# Patient Record
Sex: MALE | Race: WHITE | NOT HISPANIC OR LATINO | Employment: UNEMPLOYED | URBAN - METROPOLITAN AREA
[De-identification: names, ages, dates, MRNs, and addresses within clinical notes are randomized per-mention and may not be internally consistent; named-entity substitution may affect disease eponyms.]

---

## 2017-03-27 ENCOUNTER — MEDICAL CORRESPONDENCE (OUTPATIENT)
Dept: HEALTH INFORMATION MANAGEMENT | Facility: CLINIC | Age: 42
End: 2017-03-27

## 2017-03-27 ENCOUNTER — TRANSFERRED RECORDS (OUTPATIENT)
Dept: HEALTH INFORMATION MANAGEMENT | Facility: CLINIC | Age: 42
End: 2017-03-27

## 2017-04-03 ENCOUNTER — OFFICE VISIT (OUTPATIENT)
Dept: FAMILY MEDICINE | Facility: CLINIC | Age: 42
End: 2017-04-03

## 2017-04-03 VITALS
SYSTOLIC BLOOD PRESSURE: 114 MMHG | HEART RATE: 83 BPM | TEMPERATURE: 97.5 F | OXYGEN SATURATION: 98 % | BODY MASS INDEX: 27.94 KG/M2 | DIASTOLIC BLOOD PRESSURE: 74 MMHG | WEIGHT: 206 LBS

## 2017-04-03 DIAGNOSIS — F25.9 SCHIZOAFFECTIVE DISORDER, UNSPECIFIED TYPE (H): Primary | ICD-10-CM

## 2017-04-03 DIAGNOSIS — Z51.81 ENCOUNTER FOR THERAPEUTIC DRUG MONITORING: ICD-10-CM

## 2017-04-03 DIAGNOSIS — F43.10 PTSD (POST-TRAUMATIC STRESS DISORDER): ICD-10-CM

## 2017-04-03 DIAGNOSIS — Z51.81 ENCOUNTER FOR THERAPEUTIC DRUG MONITORING: Primary | ICD-10-CM

## 2017-04-03 LAB
ALBUMIN SERPL BCP-MCNC: 3.9 G/DL (ref 3.5–5)
ALP SERPL-CCNC: 59 U/L (ref 45–120)
ALT SERPL W/O P-5'-P-CCNC: 28 U/L (ref 0–45)
AMYLASE SERPL-CCNC: 33 U/L (ref 5–120)
AST SERPL-CCNC: 20 U/L (ref 0–40)
BILIRUB DIRECT SERPL-MCNC: 0.3 MG/DL (ref 0–0.5)
BILIRUB SERPL-MCNC: 0.9 MG/DL (ref 0–1)
CHOLEST SERPL-MCNC: 199 MG/DL
ERYTHROCYTE [DISTWIDTH] IN BLOOD BY AUTOMATED COUNT: 12.3 % (ref 11–14.5)
FASTING?: YES
FASTING?: YES
GLUCOSE SERPL-MCNC: 68 MG/DL (ref 70–125)
HCT VFR BLD AUTO: 47.6 % (ref 40–54)
HDLC SERPL-MCNC: 41 MG/DL
HGB BLD-MCNC: 17.1 G/DL (ref 14–18)
LDLC SERPL CALC-MCNC: 132 MG/DL
MCH RBC QN AUTO: 34.1 PG (ref 27–34)
MCHC RBC AUTO-ENTMCNC: 35.9 G/DL (ref 32–36)
MCV RBC AUTO: 95 FL (ref 80–100)
PLATELET # BLD AUTO: 216 THOU/UL (ref 140–440)
PMV BLD AUTO: 11.5 FL (ref 8.5–12.5)
PROT SERPL-MCNC: 6.8 G/DL (ref 6–8)
RBC # BLD AUTO: 5.01 MILL/UL (ref 4.4–6.2)
TRIGL SERPL-MCNC: 128 MG/DL
VALPROATE SERPL-MCNC: 72.8 UG/ML (ref 50–150)
WBC # BLD AUTO: 6.2 THOU/UL (ref 4–11)

## 2017-04-03 RX ORDER — BENZTROPINE MESYLATE 2 MG/1
2 TABLET ORAL AT BEDTIME
COMMUNITY
End: 2018-05-18

## 2017-04-03 RX ORDER — FLUPHENAZINE DECANOATE 25 MG/ML
1.5 INJECTION, SOLUTION INTRAMUSCULAR; SUBCUTANEOUS
COMMUNITY
End: 2018-05-18

## 2017-04-03 RX ORDER — HYDROXYZINE HYDROCHLORIDE 25 MG/1
TABLET, FILM COATED ORAL
COMMUNITY
End: 2018-05-18

## 2017-04-03 RX ORDER — DIVALPROEX SODIUM 500 MG/1
1500 TABLET, EXTENDED RELEASE ORAL
COMMUNITY
Start: 2016-05-31 | End: 2017-04-03

## 2017-04-03 RX ORDER — BENZTROPINE MESYLATE 1 MG/1
1 TABLET ORAL 2 TIMES DAILY
COMMUNITY
End: 2018-05-18

## 2017-04-03 NOTE — PROGRESS NOTES
"       SUBJECTIVE   SUBJECTIVE:  Bertram Woods is a 41-year-old male w/past medical history of schizoaffective disorder, PTSD who presented for medication lab monitoring per his psychiatrist.     Last year, he was admitted to both [facility] on multiple occasions and was eventually placed under civil commitment.  Currently, he is established with [facility] and [facility] and is on injectable Prolixin every three weeks, and daily Depakote, Cogentin, and hydroxyzine.     He stated today that he is doing \"really well.\"  He has been doing well at his sober house for the past 7 months. Patient denied suicidal or homicidal ideation.  He denied auditory or visual hallucinations.  He had no other complaints at this time.  He brought his lab requisition orders with him.     He is accompanied by Idania his .     PMH, Medications and Allergies were reviewed and updated as needed.      REVIEW OF SYSTEMS   General: No fevers, chills, recent weight changes  HEENT: No headache, vision changes, sore throat  CV: No chest pain or palpitations.  Resp: No shortness of breath.    Psych: No SI/HI, VH, AH      Family and Social Hx     PMH:   Past Medical History:   Diagnosis Date     PTSD (post-traumatic stress disorder)      Schizoaffective disorder (H)          PSH: History reviewed. No pertinent surgical history.  SH:   Social History     Social History     Marital status: Unknown     Spouse name: N/A     Number of children: N/A     Years of education: N/A     Occupational History     Not on file.     Social History Main Topics     Smoking status: Current Every Day Smoker     Packs/day: 1.50     Types: Cigarettes     Smokeless tobacco: Never Used     Alcohol use No     Drug use: No     Sexual activity: Not on file     Other Topics Concern     Not on file     Social History Narrative     FH: non-contributory       Family History   Problem Relation Age of Onset     Colon Cancer Father          OBJECTIVE "     Vitals:    04/03/17 0900   BP: 114/74   Pulse: 83   Temp: 97.5  F (36.4  C)   TempSrc: Oral   SpO2: 98%   Weight: 206 lb (93.4 kg)     Body mass index is 27.94 kg/(m^2).  Gen:  Well nourished and in NAD  HEENT: PERRL. EOMI   CV:  Good distal perfusion.  Pulm:  Normal work of breathing  Extrem: No cyanosis, edema or clubbing.   MSK: gross motor and sensation intact, gait normal, tone normal  Skin: no suspicious lesions or rashes  Psych: Alert and oriented. Calm, cooperative, pleasant with good eye contact. No abnormalities of speech or psychomotor behavior. Mood is euthymic with congruent affect and full range.      Results for orders placed or performed in visit on 04/03/17   Glucose ([facility])   Result Value Ref Range    Glucose 68 (L) 70 - 125 mg/dL    Fasting? Yes     Narrative    Test performed by:  [facility] [address] [place] [address]  Fasting Glucose reference range is 70-99 mg/dL per  American Diabetes Association (ADA) guidelines.   Lipid [place] ([facility]) - Results > 1 hr   Result Value Ref Range    Cholesterol 199 <=199 mg/dL    Triglycerides 128 <=149 mg/dL    HDL Cholesterol 41 >=40 mg/dL    LDL Cholesterol Calculated 132 (H) <=129 mg/dL    Fasting? Yes     Narrative    Test performed by:  [facility]  [address] [place] [address]   Amylase ([facility])   Result Value Ref Range    Amylase 33 5 - 120 U/L    Narrative    Test performed by:  [facility] [address] [place] [address]   CBC with Plt ([facility])   Result Value Ref Range    WBC 6.2 4.0 - 11.0 thou/uL    RBC 5.01 4.40 - 6.20 mill/uL    Hemoglobin 17.1 14.0 - 18.0 g/dL    Hematocrit 47.6 40.0 - 54.0 %    MCV 95 80 - 100 fL    MCH 34.1 (H) 27.0 - 34.0 pg    MCHC 35.9 32.0 - 36.0 g/dL    RDW 12.3 11.0 - 14.5 %    Platelets 216 140 - 440 thou/uL    Mean Platelet Volume 11.5 8.5 - 12.5 fL    Narrative    Test performed by:  [facility]  [address] [place] [id number]   Hepatic Profile ([facility])   Result Value Ref Range    Bilirubin Total 0.9 0.0 - 1.0 mg/dL    Bilirubin Direct 0.3 <=0.5 mg/dL    Protein Total 6.8 6.0 - 8.0 g/dL    Albumin 3.9 3.5 - 5.0 g/dL    Alkaline Phosphatase 59 45 - 120 U/L    AST (SGOT) 20 0 - 40 U/L    ALT (SGPT) 28 0 - 45 U/L    Narrative    Test performed by:  [facility]  [address] [place] [address]   Valproic Acid ([facility])   Result Value Ref Range    Valproic Acid 72.8 50.0 - 150.0 ug/mL    Narrative    Test performed by:  [facility] [address] [place] [address]         ASSESSMENT AND PLAN     This 41 year old  male presents for labs.    1. Schizoaffective disorder, unspecified type (H)  2. PTSD (post-traumatic stress disorder)  3. Encounter for therapeutic drug monitoring  -Labs for atypical medication monitoring and depakote monitoring ordered.  -Continue follow up with his psychiatrist  -MILA signed for [facility]  -Instructed to follow up for CPE    RTC in 1-3 months for CPE or sooner if develops new or worsening symptoms.  Patient discussed and seen with Alida Medrano MD, attending physician who agrees with the plan.     Ry Esparza DO PGY-2  [facility]   Pager: [phone]

## 2017-04-03 NOTE — LETTER
April 7, 2017      Bertram Woods  [address] [place] [address]        Dear Bertram,    Please see below for your test results.  Normal.      Resulted Orders   Glucose ([facility])   Result Value Ref Range    Glucose 68 (L) 70 - 125 mg/dL    Fasting? Yes     Narrative    Test performed by:  [facility]  [address] [place] [address]  Fasting Glucose reference range is 70-99 mg/dL per  American Diabetes Association (ADA) guidelines.   Lipid [place] ([facility]) - Results > 1 hr   Result Value Ref Range    Cholesterol 199 <=199 mg/dL    Triglycerides 128 <=149 mg/dL    HDL Cholesterol 41 >=40 mg/dL    LDL Cholesterol Calculated 132 (H) <=129 mg/dL    Fasting? Yes     Narrative    Test performed by:  [facility]  [address] [place] [address]   Amylase ([facility])   Result Value Ref Range    Amylase 33 5 - 120 U/L    Narrative    Test performed by:  [facility]  [address] [place] [address]   CBC with Plt ([facility])   Result Value Ref Range    WBC 6.2 4.0 - 11.0 thou/uL    RBC 5.01 4.40 - 6.20 mill/uL    Hemoglobin 17.1 14.0 - 18.0 g/dL    Hematocrit 47.6 40.0 - 54.0 %    MCV 95 80 - 100 fL    MCH 34.1 (H) 27.0 - 34.0 pg    MCHC 35.9 32.0 - 36.0 g/dL    RDW 12.3 11.0 - 14.5 %    Platelets 216 140 - 440 thou/uL    Mean Platelet Volume 11.5 8.5 - 12.5 fL    Narrative    Test performed by:  [facility]  [address] [place] [address]   Hepatic Profile ([facility])   Result Value Ref Range    Bilirubin Total 0.9 0.0 - 1.0 mg/dL    Bilirubin Direct 0.3 <=0.5 mg/dL    Protein Total 6.8 6.0 - 8.0 g/dL    Albumin 3.9 3.5 - 5.0 g/dL    Alkaline Phosphatase 59 45 - 120 U/L    AST (SGOT) 20 0 - 40 U/L    ALT (SGPT) 28 0 - 45 U/L    Narrative    Test performed by:  [facility]  [address] [place] [address]   Valproic Acid ([facility])   Result Value Ref Range    Valproic Acid 72.8 50.0 - 150.0 ug/mL      Comment:          Recommended therapeutic trough conc range when used  for manic episodes associated with bipolar disorder:   ug/ml.      Narrative    Test performed by:  [facility] [address] [place] [address]       If you have any questions, please call the clinic to make an appointment.    Sincerely,    Ry Esparza, DO

## 2017-04-03 NOTE — MR AVS SNAPSHOT
After Visit Summary   4/3/2017    Bertram Woods    MRN: [id number]           Patient Information     Date Of Birth          1975        Visit Information        Provider Department      4/3/2017 9:00 AM Ry Esparza,  [facility]        Today's Diagnoses     Schizoaffective disorder, unspecified type (H)    -  1    PTSD (post-traumatic stress disorder)        Encounter for therapeutic drug monitoring           Follow-ups after your visit        Who to contact     Please call your clinic at [phone] to:    Ask questions about your health    Make or cancel appointments    Discuss your medicines    Learn about your test results    Speak to your doctor   If you have compliments or concerns about an experience at your clinic, or if you wish to file a complaint, please contact [facility] Patient Relations at [phone] or email us at [email or web address]         Additional Information About Your Visit        MyChart Information     Vopium is an electronic gateway that provides easy, online access to your medical records. With Vopium, you can request a clinic appointment, read your test results, renew a prescription or communicate with your care team.     To sign up for [email or web address] visit the website at [email or web address]   You will be asked to enter the access code listed below, as well as some personal information. Please follow the directions to create your username and password.     Your access code is: [email or web address]  Expires: 7/3/2017 11:52 PM     Your access code will  in 90 days. If you need help or a new code, please contact your [facility] Clinic or call [phone] for assistance.        Care EveryWhere ID     This is your Care EveryWhere ID. This could be used by other organizations to access your [place] medical records  [id number]        Your Vitals Were     Pulse Temperature Pulse Oximetry  BMI (Body Mass Index)          83 97.5  F (36.4  C) (Oral) 98% 27.94 kg/m2         Blood Pressure from Last 3 Encounters:   04/03/17 114/74   11/09/15 130/90   06/05/13 117/78    Weight from Last 3 Encounters:   04/03/17 206 lb (93.4 kg)   11/09/15 184 lb 3.2 oz (83.6 kg)   06/05/13 206 lb (93.4 kg)              We Performed the Following     Amylase ([facility])     CBC with Plt ([facility])     Glucose ([facility])     Hepatic Profile ([facility])     Lipid [place] ([facility]) - Results > 1 hr     Valproic Acid ([facility])        Primary Care Provider Office Phone # Fax #    Teddy Shelby [phone]       [address] [address]        Thank you!     Thank you for choosing [facility]  for your care. Our goal is always to provide you with excellent care. Hearing back from our patients is one way we can continue to improve our services. Please take a few minutes to complete the written survey that you may receive in the mail after your visit with us. Thank you!             Your Updated Medication List - Protect others around you: Learn how to safely use, store and throw away your medicines at www.disposemymeds.org.          This list is accurate as of: 4/3/17 11:59 PM.  Always use your most recent med list.                   Brand Name Dispense Instructions for use    * benztropine 1 MG tablet    COGENTIN     Take 1 mg by mouth 2 times daily       * benztropine 2 MG tablet    COGENTIN     Take 2 mg by mouth At Bedtime       DEPAKOTE  MG 24 hr tablet   Generic drug:  divalproex      Take 3 tablets by mouth At Bedtime.       fluPHENAZine decanoate 25 MG/ML injection    PROLIXIN     Inject 1.5 mLs into the muscle every 21 days       hydrOXYzine 25 MG tablet    ATARAX     1-2 tabs 4 times daily as needed       * Notice:  This list has 2 medication(s) that are the same as other medications prescribed for you. Read the directions carefully, and ask your doctor  or other care provider to review them with you.

## 2017-04-03 NOTE — PROGRESS NOTES
Preceptor attestation:  Patient seen and discussed with the resident. Assessment and plan reviewed with resident and agreed upon.  Supervising physician: Alida Medrano  [facility]

## 2017-04-04 LAB — HBA1C MFR BLD: 4.9 % (ref 4.2–6.1)

## 2017-04-07 ENCOUNTER — DOCUMENTATION ONLY (OUTPATIENT)
Dept: LAB | Facility: CLINIC | Age: 42
End: 2017-04-07

## 2017-04-07 NOTE — PROGRESS NOTES
From:  [address]  [address]  [phone]  Fax [phone]      To: [facility]    Attn: Dr. Adina Cleveland    Fax Number: [phone]    Date: 4/7/2017    RE: Bertram Woods 1975 MRN [id number]      RESULTS FOR YOUR REVIEW:    Exam Information for all labs  Exam Date Exam Time Accession # Results    4/3/17 10:11 AM [id number]    Component Results   Component Value Flag Ref Range Units Status Collected Lab   Hemoglobin A1C 4.9  4.2 - 6.1 % Final 04/03/2017 10:11    Narrative   Test [facility]:  [facility]  [address] [place] [address]       Component Value Flag Ref Range Units Status Collected Lab   Valproic Acid 72.8  50.0 - 150.0 ug/mL Final 04/03/2017 10:11    Comment:       Recommended therapeutic trough conc range when used   for manic episodes associated with bipolar disorder:    ug/ml.      Narrative   Test performed by:  [facility]  [address] [place] [address]     Component Results   Component Value Flag Ref Range Units Status Collected Lab   Bilirubin Total 0.9  0.0 - 1.0 mg/dL Final 04/03/2017 10:11    Bilirubin Direct 0.3  <=0.5 mg/dL Final 04/03/2017 10:11    Protein Total 6.8  6.0 - 8.0 g/dL Final 04/03/2017 10:11    Albumin 3.9  3.5 - 5.0 g/dL Final 04/03/2017 10:11    Alkaline Phosphatase 59  45 - 120 U/L Final 04/03/2017 10:11    AST (SGOT) 20  0 - 40 U/L Final 04/03/2017 10:11    ALT (SGPT) 28  0 - 45 U/L Final 04/03/2017 10:11    Narrative   Test performed by:   [facility]     Component Results   Component Value Flag Ref Range Units Status Collected Lab   WBC 6.2  4.0 - 11.0 thou/uL Final 04/03/2017 10:11    RBC 5.01  4.40 - 6.20 mill/uL Final 04/03/2017 10:11    Hemoglobin 17.1  14.0 - 18.0 g/dL Final 04/03/2017 10:11    Hematocrit 47.6  40.0 - 54.0 % Final 04/03/2017 10:11     MCV 95  80 - 100 fL Final 04/03/2017 10:11    MCH 34.1 (H) 27.0 - 34.0 pg Final 04/03/2017 10:11    MCHC 35.9  32.0 - 36.0 g/dL Final 04/03/2017 10:11    RDW 12.3  11.0 - 14.5 % Final 04/03/2017 10:11    Platelets 216  140 - 440 thou/uL Final 04/03/2017 10:11    Mean Platelet Volume 11.5  8.5 - 12.5 fL Final 04/03/2017 10:11    Narrative   Test performed by:  [facility]  [address] [place] [address]     Component Results   Component Value Flag Ref Range Units Status Collected Lab   Amylase 33  5 - 120 U/L Final 04/03/2017 10:11    Narrative   Test performed by:  [facility]  [address] [place] [address]     Component Results   Component Value Flag Ref Range Units Status Collected Lab   Cholesterol 199  <=199 mg/dL Final 04/03/2017 10:11    Triglycerides 128  <=149 mg/dL Final 04/03/2017 10:11    HDL Cholesterol 41  >=40 mg/dL Final 04/03/2017 10:11    LDL Cholesterol Calculated 132 (H) <=129 mg/dL Final 04/03/2017 10:11    Fasting? Yes    Final 04/03/2017 10:11    Narrative   Test performed by:  [facility]  [address] [place] [address]     Component Results   Component Value Flag Ref Range Units Status Collected Lab   Glucose 68 (L) 70 - 125 mg/dL Final 04/03/2017 10:11    Fasting? Yes    Final 04/03/2017 10:11    Narrative   Test performed by:  [facility]  [address] [place] [address]  Fasting Glucose reference range is 70-99 mg/dL per  American Diabetes Association (ADA) guidelines.     Thanks,  Teddy Shelyb    Results faxed by Nahum Sandhu

## 2018-02-14 ENCOUNTER — MEDICAL CORRESPONDENCE (OUTPATIENT)
Dept: HEALTH INFORMATION MANAGEMENT | Facility: CLINIC | Age: 43
End: 2018-02-14

## 2018-02-22 DIAGNOSIS — Z79.899 ENCOUNTER FOR LONG-TERM (CURRENT) USE OF MEDICATIONS: Primary | ICD-10-CM

## 2018-02-22 DIAGNOSIS — Z79.899 ENCOUNTER FOR LONG-TERM (CURRENT) USE OF MEDICATIONS: ICD-10-CM

## 2018-02-22 LAB
ALBUMIN SERPL BCP-MCNC: 3.9 G/DL (ref 3.5–5)
ALP SERPL-CCNC: 65 U/L (ref 45–120)
ALT SERPL W/O P-5'-P-CCNC: 44 U/L (ref 0–45)
ANION GAP SERPL CALCULATED.3IONS-SCNC: 8 MMOL/L (ref 5–18)
AST SERPL-CCNC: 24 U/L (ref 0–40)
BASOPHILS # BLD AUTO: 0 THOU/UL (ref 0–0.2)
BASOPHILS NFR BLD AUTO: 1 % (ref 0–2)
BILIRUB SERPL-MCNC: 0.6 MG/DL (ref 0–1)
BUN SERPL-MCNC: 9 MG/DL (ref 8–22)
CALCIUM SERPL-MCNC: 9.5 MG/DL (ref 8.5–10.5)
CHLORIDE SERPL-SCNC: 104 MMOL/L (ref 98–107)
CHOLEST SERPL-MCNC: 251 MG/DL
CO2 SERPL-SCNC: 26 MMOL/L (ref 22–31)
CREAT SERPL-MCNC: 0.79 MG/DL (ref 0.7–1.3)
EOSINOPHIL # BLD AUTO: 0.2 THOU/UL (ref 0–0.4)
EOSINOPHIL NFR BLD AUTO: 4 % (ref 0–6)
ERYTHROCYTE [DISTWIDTH] IN BLOOD BY AUTOMATED COUNT: 12.3 % (ref 11–14.5)
FASTING?: YES
GLUCOSE SERPL-MCNC: 77 MG/DL (ref 70–125)
HCT VFR BLD AUTO: 49.7 % (ref 40–54)
HDLC SERPL-MCNC: 43 MG/DL
HGB BLD-MCNC: 16.8 G/DL (ref 14–18)
LDLC SERPL CALC-MCNC: 177 MG/DL
LYMPHOCYTES # BLD AUTO: 2.1 THOU/UL (ref 0.8–4.4)
LYMPHOCYTES NFR BLD AUTO: 42 % (ref 20–40)
MCH RBC QN AUTO: 33.7 PG (ref 27–34)
MCHC RBC AUTO-ENTMCNC: 33.8 G/DL (ref 32–36)
MCV RBC AUTO: 100 FL (ref 80–100)
MONOCYTES # BLD AUTO: 0.5 THOU/UL (ref 0–0.9)
MONOCYTES NFR BLD AUTO: 10 % (ref 2–10)
NEUTROPHILS # BLD AUTO: 2.3 THOU/UL (ref 2–7.7)
NEUTROPHILS NFR BLD AUTO: 44 % (ref 50–70)
PLATELET # BLD AUTO: 221 THOU/UL (ref 140–440)
PMV BLD AUTO: 11.2 FL (ref 8.5–12.5)
POTASSIUM SERPL-SCNC: 4.8 MMOL/L (ref 3.5–5)
PROT SERPL-MCNC: 6.8 G/DL (ref 6–8)
RBC # BLD AUTO: 4.99 MILL/UL (ref 4.4–6.2)
SODIUM SERPL-SCNC: 138 MMOL/L (ref 136–145)
T4 FREE SERPL-MCNC: 1 NG/DL (ref 0.7–1.8)
TRIGL SERPL-MCNC: 156 MG/DL
TSH SERPL DL<=0.05 MIU/L-ACNC: 1.21 UIU/ML (ref 0.3–5)
VALPROATE SERPL-MCNC: 70.9 UG/ML (ref 50–150)
WBC # BLD AUTO: 5.1 THOU/UL (ref 4–11)

## 2018-02-23 LAB — HBA1C MFR BLD: 4.9 % (ref 4.2–6.1)

## 2018-03-06 ENCOUNTER — DOCUMENTATION ONLY (OUTPATIENT)
Dept: FAMILY MEDICINE | Facility: CLINIC | Age: 43
End: 2018-03-06

## 2018-03-06 NOTE — PROGRESS NOTES
From:  [address]  [address]  [phone]  Fax [phone]      To: [employer]    Attn: Nik Yo    Fax Number:[phone]    Date: 3/6/2018    RE: Bertram Woods 1975 MRN [id number]      RESULTS FOR YOUR REVIEW:    Orders Only on 02/22/2018   Component Date Value Ref Range Status     WBC 02/22/2018 5.1  4.0 - 11.0 thou/uL Final     RBC 02/22/2018 4.99  4.40 - 6.20 mill/uL Final     Hemoglobin 02/22/2018 16.8  14.0 - 18.0 g/dL Final     Hematocrit 02/22/2018 49.7  40.0 - 54.0 % Final     MCV 02/22/2018 100  80 - 100 fL Final     MCH 02/22/2018 33.7  27.0 - 34.0 pg Final     MCHC 02/22/2018 33.8  32.0 - 36.0 g/dL Final     RDW 02/22/2018 12.3  11.0 - 14.5 % Final     Platelets 02/22/2018 221  140 - 440 thou/uL Final     Mean Platelet Volume 02/22/2018 11.2  8.5 - 12.5 fL Final     % Neutrophils 02/22/2018 44* 50 - 70 % Final     % Lymphocytes 02/22/2018 42* 20 - 40 % Final     % Monocytes 02/22/2018 10  2 - 10 % Final     % Eosinophils 02/22/2018 4  0 - 6 % Final     % Basophils 02/22/2018 1  0 - 2 % Final     Neutrophils (Absolute) 02/22/2018 2.3  2.0 - 7.7 thou/uL Final     Lymphs (Absolute) 02/22/2018 2.1  0.8 - 4.4 thou/uL Final     Monocytes(Absolute) 02/22/2018 0.5  0.0 - 0.9 thou/uL Final     Eos (Absolute) 02/22/2018 0.2  0.0 - 0.4 thou/uL Final     Baso (Absolute) 02/22/2018 0.0  0.0 - 0.2 thou/uL Final     Sodium 02/22/2018 138  136 - 145 mmol/L Final     Potassium 02/22/2018 4.8  3.5 - 5.0 mmol/L Final     Chloride 02/22/2018 104  98 - 107 mmol/L Final     CO2, Total 02/22/2018 26  22 - 31 mmol/L Final     Anion Gap 02/22/2018 8  5 - 18 mmol/L Final     Glucose 02/22/2018 77  70 - 125 mg/dL Final     Urea Nitrogen 02/22/2018 9  8 - 22 mg/dL Final     Creatinine 02/22/2018 0.79  0.70 - 1.30 mg/dL Final     GFR Estimate If Black 02/22/2018 >60  >60 mL/min/1.73m2 Final     GFR Estimate 02/22/2018 >60  >60 mL/min/1.73m2 Final      Bilirubin Total 02/22/2018 0.6  0.0 - 1.0 mg/dL Final     Calcium 02/22/2018 9.5  8.5 - 10.5 mg/dL Final     Protein Total 02/22/2018 6.8  6.0 - 8.0 g/dL Final     Albumin 02/22/2018 3.9  3.5 - 5.0 g/dL Final     Alkaline Phosphatase 02/22/2018 65  45 - 120 U/L Final     AST (SGOT) 02/22/2018 24  0 - 40 U/L Final     ALT (SGPT) 02/22/2018 44  0 - 45 U/L Final     TSH 02/22/2018 1.21  0.30 - 5.00 uIU/mL Final     T4 Free 02/22/2018 1.0  0.7 - 1.8 ng/dL Final     Hemoglobin A1C 02/22/2018 4.9  4.2 - 6.1 % Final     Cholesterol 02/22/2018 251* <=199 mg/dL Final     Triglycerides 02/22/2018 156* <=149 mg/dL Final     HDL Cholesterol 02/22/2018 43  >=40 mg/dL Final     LDL Cholesterol Calculated 02/22/2018 177* <=129 mg/dL Final     Fasting? 02/22/2018 Yes   Final     Valproic Acid 02/22/2018 70.9  50.0 - 150.0 ug/mL Final    Comment:    Recommended therapeutic trough conc range when used  for manic episodes associated with bipolar disorder:   ug/ml.         COMMENTS:         Ann,  Teddy Shelby    Results faxed by [employer]

## 2018-04-02 ENCOUNTER — TRANSFERRED RECORDS (OUTPATIENT)
Dept: HEALTH INFORMATION MANAGEMENT | Facility: CLINIC | Age: 43
End: 2018-04-02

## 2018-04-03 ENCOUNTER — TRANSFERRED RECORDS (OUTPATIENT)
Dept: HEALTH INFORMATION MANAGEMENT | Facility: CLINIC | Age: 43
End: 2018-04-03

## 2018-04-22 ENCOUNTER — TRANSFERRED RECORDS (OUTPATIENT)
Dept: HEALTH INFORMATION MANAGEMENT | Facility: CLINIC | Age: 43
End: 2018-04-22

## 2018-04-24 ENCOUNTER — TRANSFERRED RECORDS (OUTPATIENT)
Dept: HEALTH INFORMATION MANAGEMENT | Facility: CLINIC | Age: 43
End: 2018-04-24

## 2018-05-18 ENCOUNTER — HOSPITAL ENCOUNTER (OUTPATIENT)
Dept: ULTRASOUND IMAGING | Facility: CLINIC | Age: 43
Discharge: HOME | End: 2018-05-18
Attending: FAMILY MEDICINE

## 2018-05-18 ENCOUNTER — MEDICAL CORRESPONDENCE (OUTPATIENT)
Dept: HEALTH INFORMATION MANAGEMENT | Facility: CLINIC | Age: 43
End: 2018-05-18

## 2018-05-18 ENCOUNTER — OFFICE VISIT (OUTPATIENT)
Dept: FAMILY MEDICINE | Facility: CLINIC | Age: 43
End: 2018-05-18
Payer: COMMERCIAL

## 2018-05-18 ENCOUNTER — RECORDS - HEALTHEAST (OUTPATIENT)
Dept: ADMINISTRATIVE | Facility: OTHER | Age: 43
End: 2018-05-18

## 2018-05-18 VITALS
DIASTOLIC BLOOD PRESSURE: 65 MMHG | BODY MASS INDEX: 31.46 KG/M2 | SYSTOLIC BLOOD PRESSURE: 96 MMHG | TEMPERATURE: 98.5 F | RESPIRATION RATE: 20 BRPM | WEIGHT: 232 LBS | HEART RATE: 85 BPM | OXYGEN SATURATION: 95 %

## 2018-05-18 DIAGNOSIS — M79.89 LEFT LEG SWELLING: ICD-10-CM

## 2018-05-18 DIAGNOSIS — Z23 NEED FOR VACCINATION: ICD-10-CM

## 2018-05-18 DIAGNOSIS — M79.89 LEG SWELLING: ICD-10-CM

## 2018-05-18 DIAGNOSIS — F25.9 SCHIZOAFFECTIVE DISORDER, UNSPECIFIED TYPE (H): Primary | ICD-10-CM

## 2018-05-18 PROBLEM — M1A.00X0 IDIOPATHIC CHRONIC GOUT WITHOUT TOPHUS, UNSPECIFIED SITE: Status: ACTIVE | Noted: 2018-05-18

## 2018-05-18 RX ORDER — NAPROXEN 500 MG/1
500 TABLET ORAL 2 TIMES DAILY PRN
Qty: 30 TABLET | Refills: 1 | COMMUNITY
Start: 2018-05-18 | End: 2019-10-17

## 2018-05-18 RX ORDER — DIVALPROEX SODIUM 500 MG/1
2000 TABLET, EXTENDED RELEASE ORAL AT BEDTIME
Status: ON HOLD | COMMUNITY
Start: 2018-04-30 | End: 2019-10-21

## 2018-05-18 RX ORDER — OLANZAPINE 15 MG/1
15 TABLET ORAL
COMMUNITY
Start: 2018-04-30 | End: 2019-10-17

## 2018-05-18 NOTE — MR AVS SNAPSHOT
After Visit Summary   2018    Bertram Woods    MRN: [id number]           Patient Information     Date Of Birth          1975        Visit Information        Provider Department      2018 2:30 PM Teddy Navarro MD [facility]        Today's Diagnoses     Schizoaffective disorder, unspecified type (H)    -  1    Left leg swelling        Need for vaccination          Care Instructions    US LOWER LEFT VENOUS: STAT HOLD & CALL DR. NAVARRO  [facility]  Radiology Department 1st floor  [address] [address]  [phone]  Date:  Friday May 18, 2018  Time:  6:00 PM  Given to patient.  Sheron Mobley  18              Follow-ups after your visit        Who to contact     Please call your clinic at [phone] to:    Ask questions about your health    Make or cancel appointments    Discuss your medicines    Learn about your test results    Speak to your doctor            Additional Information About Your Visit        MyChart Information     OncoEthixt is an electronic gateway that provides easy, online access to your medical records. With [employer], you can request a clinic appointment, read your test results, renew a prescription or communicate with your care team.     To sign up for OncoEthixt visit the website at [email or web address]   You will be asked to enter the access code listed below, as well as some personal information. Please follow the directions to create your username and password.     Your access code is: [email or web address]  Expires: 2018  2:48 PM     Your access code will  in 90 days. If you need help or a new code, please contact your [facility] or call [phone] for assistance.        Care EveryWhere ID     This is your Care EveryWhere ID. This could be used by other organizations to access your [place] medical records  [id number]        Your Vitals Were     Pulse Temperature Respirations Pulse  Oximetry BMI (Body Mass Index)       85 98.5  F (36.9  C) (Oral) 20 95% 31.46 kg/m2        Blood Pressure from Last 3 Encounters:   05/18/18 96/65   04/03/17 114/74   11/09/15 130/90    Weight from Last 3 Encounters:   05/18/18 232 lb (105.2 kg)   04/03/17 206 lb (93.4 kg)   11/09/15 184 lb 3.2 oz (83.6 kg)              We Performed the Following     ADMIN VACCINE, INITIAL     TDAP VACCINE (BOOSTRIX)          Today's Medication Changes          These changes are accurate as of 5/18/18 11:59 PM.  If you have any questions, ask your nurse or doctor.               These medicines have changed or have updated prescriptions.        Dose/Directions    divalproex sodium extended-release 500 MG 24 hr tablet   Commonly known as:  DEPAKOTE ER   This may have changed:  Another medication with the same name was removed. Continue taking this medication, and follow the directions you see here.   Changed by:  Teddy Shelby MD        Dose:  1500 mg   Take 1,500 mg by mouth   Refills:  0         Stop taking these medicines if you haven't already. Please contact your care team if you have questions.     benztropine 1 MG tablet   Commonly known as:  COGENTIN   Stopped by:  Teddy Shelby MD           benztropine 2 MG tablet   Commonly known as:  COGENTIN   Stopped by:  Teddy Shelby MD           fluPHENAZine decanoate 25 MG/ML injection   Commonly known as:  PROLIXIN   Stopped by:  Teddy Shelby MD           hydrOXYzine 25 MG tablet   Commonly known as:  ATARAX   Stopped by:  Teddy Shelby MD                    Primary Care Provider Office Phone # Fax #    Teddy Shelby [phone]       [address] [place] [address]        Equal Access to Services     [facility] Center[facility]IMBER : Brody Ramirez, walalitada luqadaha, qaybta kaalmada taiwoyada, rogerio chavez. So [facility] [phone].    ATENCIÓN: Si habla español, tiene a ruiz disposición servicios gratuitos de asistencia lingüística.  Nathaniel paulino [phone].    We comply with applicable federal civil rights laws and [place] laws. We do not discriminate on the basis of race, color, national origin, age, disability, sex, sexual orientation, or gender identity.            Thank you!     Thank you for choosing [facility]  for your care. Our goal is always to provide you with excellent care. Hearing back from our patients is one way we can continue to improve our services. Please take a few minutes to complete the written survey that you may receive in the mail after your visit with us. Thank you!             Your Updated Medication List - Protect others around you: Learn how to safely use, store and throw away your medicines at www.disposemymeds.org.          This list is accurate as of 5/18/18 11:59 PM.  Always use your most recent med list.                   Brand Name Dispense Instructions for use Diagnosis    divalproex sodium extended-release 500 MG 24 hr tablet    DEPAKOTE ER     Take 1,500 mg by mouth        NAPROSYN 500 MG tablet   Generic drug:  naproxen     30 tablet    Take 1 tablet (500 mg) by mouth 2 times daily as needed for moderate pain        OLANZapine 15 MG tablet    zyPREXA     Take 15 mg by mouth

## 2018-05-18 NOTE — PROGRESS NOTES
"Legs \"locked up\" 2 wks ago    Gout  In ED    Subjective: Bertram Woods is a 43 year old who presents today for:    1.  He is diagnosed with gout during a recent admission to [facility].  He had an aspiration showing crystals.  He is improved on NSAIDs.  He has never had a previous history of gout.  He states it is much better now on his medications and is wondering what further treatments, if any, are necessary.    2.  He is needing paperwork to be filled out for admission to another facility.    PMHX/PSHX/MEDS/ALLERGIES/SHX/FHX reviewed and updated in Epic.   ROS:   General: No fevers, chills   Head: No headache   Ears: No acute change in hearing.   CV: No chest pain or palpitations.   Resp: No shortness of breath. No cough. No hemoptysis.   Objective: BP 96/65  Pulse 85  Temp 98.5  F (36.9  C) (Oral)  Resp 20  Wt 232 lb (105.2 kg)  SpO2 95%  BMI 31.46 kg/m2   Gen: Well nourished and in NAD   CV: RRR - no murmurs, rubs, or gallups,   Pulm: CTAB, no wheezes/rales/rhonchi, good air entry   ABD: soft, nontender, BS intact  Extrem: no cyanosis, he has unilateral swelling of the left lower extremity with pitting edema.  He has no pain with this and negative Homans sign.  Psych: Euthymic    Assessment/ Plan:  1. Schizoaffective disorder, unspecified type (H)  He reports doing well on Zyprexa and Depakote.    2. Left leg swelling  I am concerned this could represent a DVT and he needs to have this imaged.  - US Lower Extremity Venous Duplex Left; Future    3. Need for vaccination  We will update his Tdap as this is due today.  - ADMIN VACCINE, INITIAL  - TDAP VACCINE (BOOSTRIX)    Total of 25 minutes was spent in face to face contact with patient with > 50% in chart review and filling out paperwork as well as coordination of care.  Options for treatment and/or follow-up care were reviewed with the patient. Bertram Woods was engaged and actively involved in the decision making process. He " verbalized understanding of the options discussed and was satisfied with the final plan.      Teddy Shelby

## 2018-05-18 NOTE — PATIENT INSTRUCTIONS
US LOWER LEFT VENOUS: STAT HOLD & CALL DR. NAVARRO  [facility]  Radiology Department 1st floor  [address] [address]  [phone]  Date:  Friday May 18, 2018  Time:  6:00 PM  Given to patient.  Sheron Mobley  5/18/18

## 2018-05-23 DIAGNOSIS — M79.89 LEFT LEG SWELLING: ICD-10-CM

## 2018-06-14 ENCOUNTER — RECORDS - HEALTHEAST (OUTPATIENT)
Dept: ADMINISTRATIVE | Facility: OTHER | Age: 43
End: 2018-06-14

## 2018-06-28 ENCOUNTER — TRANSFERRED RECORDS (OUTPATIENT)
Dept: HEALTH INFORMATION MANAGEMENT | Facility: CLINIC | Age: 43
End: 2018-06-28

## 2018-08-02 ENCOUNTER — DOCUMENTATION ONLY (OUTPATIENT)
Dept: FAMILY MEDICINE | Facility: CLINIC | Age: 43
End: 2018-08-02

## 2018-08-02 NOTE — PROGRESS NOTES
"Interprofessional Team Consultation Note     Requesting Provider: Dr Shelby    Consultants:  Behavioral Health: Dr. Lombardi  Care Coordination: Mindy Santos Cynthia  PharmD: Dr. Ren  Family Medicine Physicians: Dr Strickland    IDENTIFYING DATA/REASON FOR REFERRAL:  Bertram Woods is 43 year old male who is cared for by Dr. Shelby.? Dr. Shelby is requesting consultation related to patient frequently going to ER and mental health. ?Relevant clinical information obtained from requesting PCP, interprofessional team members noted above and review of the medical record.     Patient Active Problem List   Diagnosis     Schizoaffective disorder (H)     Lumbago     Moderate persistent asthma without complication     Amphetamine use disorder, moderate (H)     Cannabis use disorder, severe, dependence (H)     Alcohol use disorder (H)     History of nonadherence to medical treatment     Idiopathic chronic gout without tophus, unspecified site     Current Outpatient Prescriptions   Medication     divalproex sodium extended-release (DEPAKOTE ER) 500 MG 24 hr tablet     naproxen (NAPROSYN) 500 MG tablet     OLANZapine (ZYPREXA) 15 MG tablet     No current facility-administered medications for this visit.        Topics Discussed:  Pt going to ER frequently due to alcohol abuse and \"bad choices\"  Pt has no phone and is homeless.  Pt has seen [facility] in the past but not clear if still being seen there.  Will need to get MILA.    Recommendations/Action Items:  Care coordinator will try to call patient (look in care everywhere for a phone number) or see if pt has a  to make an appt to see Dr Heron ROBERTSON!!    Danielle MÁRQUEZ Glucata     Disclaimer  The above treatment recommendations are based on consultation with the patient's primary care provider and a review of relevant information in EPIC.? I have not personally examined the patient.? All recommendations should be implemented with considerations of the patient's " relevant prior history and current clinical status.  Please contact me with any questions about the care of this patient.

## 2018-09-11 ENCOUNTER — RECORDS - HEALTHEAST (OUTPATIENT)
Dept: ADMINISTRATIVE | Facility: OTHER | Age: 43
End: 2018-09-11

## 2018-09-23 ENCOUNTER — TRANSFERRED RECORDS (OUTPATIENT)
Dept: HEALTH INFORMATION MANAGEMENT | Facility: CLINIC | Age: 43
End: 2018-09-23

## 2018-11-30 ENCOUNTER — TRANSFERRED RECORDS (OUTPATIENT)
Dept: HEALTH INFORMATION MANAGEMENT | Facility: CLINIC | Age: 43
End: 2018-11-30

## 2019-10-16 ENCOUNTER — HOSPITAL ENCOUNTER (INPATIENT)
Facility: CLINIC | Age: 44
LOS: 4 days | Discharge: NURSING, REHAB OR LONG-TERM CARE | DRG: 885 | End: 2019-10-21
Attending: FAMILY MEDICINE | Admitting: PSYCHIATRY & NEUROLOGY
Payer: MEDICARE

## 2019-10-16 DIAGNOSIS — R45.851 SUICIDAL IDEATION: ICD-10-CM

## 2019-10-16 DIAGNOSIS — F25.0 SCHIZOAFFECTIVE DISORDER, BIPOLAR TYPE (H): ICD-10-CM

## 2019-10-16 DIAGNOSIS — R29.818 EXTRAPYRAMIDAL SYMPTOM: Primary | ICD-10-CM

## 2019-10-16 DIAGNOSIS — Z71.6 ENCOUNTER FOR SMOKING CESSATION COUNSELING: ICD-10-CM

## 2019-10-16 PROCEDURE — 99285 EMERGENCY DEPT VISIT HI MDM: CPT | Mod: Z6 | Performed by: FAMILY MEDICINE

## 2019-10-16 PROCEDURE — 99285 EMERGENCY DEPT VISIT HI MDM: CPT | Performed by: FAMILY MEDICINE

## 2019-10-16 ASSESSMENT — ENCOUNTER SYMPTOMS
DYSURIA: 0
HALLUCINATIONS: 1
VOMITING: 0
ACTIVITY CHANGE: 0
BRUISES/BLEEDS EASILY: 0
SHORTNESS OF BREATH: 0
DYSPHORIC MOOD: 1
AGITATION: 0
WOUND: 0
TROUBLE SWALLOWING: 0
FEVER: 0
FATIGUE: 0
WHEEZING: 0
SLEEP DISTURBANCE: 1
NERVOUS/ANXIOUS: 0
CONFUSION: 0
WEAKNESS: 0
APPETITE CHANGE: 1
NAUSEA: 0
ABDOMINAL PAIN: 0
HEADACHES: 0
DECREASED CONCENTRATION: 1
COUGH: 0

## 2019-10-16 NOTE — ED NOTES
Bed: HW03  Expected date: 10/16/19  Expected time: 3:03 PM  Means of arrival: Ambulance  Comments:  Angelic Westfall-- 44 male MH

## 2019-10-16 NOTE — ED PROVIDER NOTES
"  History     Chief Complaint   Patient presents with     Suicidal     Client planning to jump off of bridge with intention today. Having suicidal thoughts for three days.      Hallucinations     Hearing voices, they tell pt \"you're worseless\" and telling pt to do harmful things to self.      HPI  Bertram Woods is a 44 year old male who has history of schizoaffective disorder presents emergency room with auditory hallucinations with now thoughts of jumping off a bridge with intent today.  Patient lives in a group home setting.  Patient states he does not go so well.  Patient states he is on some medications but is only been compliant taking only few times a day.  Patient noted over the last several days escalating over the last 3 days some auditory hallucinations which she described as telling him that he is worthless and telling him to do harmful things to himself.  Patient still experiences he is currently at this point but he states he is not answering them.  He tried to take his medications last night but he did not sleep at all his sleep is been decreased decreased appetite also.  Denies homicidal ideations.  Is not ingesting medication denies any other illicit drug use etc. no alcohol use etc.  Patient now presents here for evaluation patient feels unsafe to the group home feels he needs to be in the hospital.  Otherwise no coughing chest pain shortness of breath patient does smoke.  He has some asthma which is controlled without medications.  No other self injury noted at this point.    I have reviewed the Medications, Allergies, Past Medical and Surgical History, and Social History in the Epic system.    Review of Systems   Constitutional: Positive for appetite change. Negative for activity change, fatigue and fever.   HENT: Negative for trouble swallowing.    Eyes: Negative for visual disturbance.   Respiratory: Negative for cough, shortness of breath and wheezing.    Cardiovascular: Negative for " chest pain and leg swelling.   Gastrointestinal: Negative for abdominal pain, nausea and vomiting.   Genitourinary: Negative for dysuria.   Musculoskeletal: Negative for gait problem.   Skin: Negative for rash and wound.   Neurological: Negative for weakness and headaches.   Hematological: Does not bruise/bleed easily.   Psychiatric/Behavioral: Positive for decreased concentration, dysphoric mood, hallucinations, sleep disturbance and suicidal ideas. Negative for agitation, behavioral problems and confusion. The patient is not nervous/anxious.    All other systems reviewed and are negative.      Physical Exam   BP: 123/81  Pulse: 82  Temp: 95.9  F (35.5  C)  Resp: 16  Weight: 96.2 kg (212 lb)  SpO2: 97 %      Physical Exam  Vitals signs and nursing note reviewed.   Constitutional:       General: He is not in acute distress.     Appearance: Normal appearance. He is well-developed. He is not diaphoretic.   HENT:      Head: Normocephalic and atraumatic.      Mouth/Throat:      Mouth: Mucous membranes are moist.   Eyes:      General: No scleral icterus.     Extraocular Movements: Extraocular movements intact.      Conjunctiva/sclera: Conjunctivae normal.      Pupils: Pupils are equal, round, and reactive to light.   Neck:      Musculoskeletal: Normal range of motion and neck supple.   Cardiovascular:      Rate and Rhythm: Normal rate and regular rhythm.   Pulmonary:      Effort: No respiratory distress.      Breath sounds: Normal breath sounds. No stridor. No wheezing.   Abdominal:      General: There is no distension.      Palpations: There is no mass.      Tenderness: There is no tenderness. There is no guarding.   Musculoskeletal:         General: No swelling, deformity or signs of injury.   Skin:     General: Skin is warm and dry.      Capillary Refill: Capillary refill takes less than 2 seconds.      Coloration: Skin is not jaundiced.      Findings: No rash.   Neurological:      General: No focal deficit present.       Mental Status: He is alert and oriented to person, place, and time.   Psychiatric:      Comments: Patient here in the ER is cooperative interacts fairly well.  Patient describes ongoing auditory hallucinations telling him to do bad things  not homicidal but he does still have suicidal ideations with intent and is feeling unsafe at the current group home.  Patient would feel safer in the hospital is voluntary.  Affect slightly flattened otherwise is cooperative sitting in chair nonthreatening.         ED Course        Procedures      Patient ER evaluated.  Will talk to intake at this point  Drug screen to be ordered.  Patient denies any illicit drug use.  Patient to be admitted to mental health unit.  Waiting for bed.  Will plan for overnight in the ER.  PLAN FOR admit in the am when bed available>  depakote and zyprexa meds ordered.         Critical Care time:  none             Labs Ordered and Resulted from Time of ED Arrival Up to the Time of Departure from the ED - No data to display        Assessments & Plan (with Medical Decision Making)  43 yo male with hx of schizoaffective disorder living in group home presents the ER with suicidal ideations plan to jump off bridge.  Patient has been inconsistent with medications.  Having auditory hallucinations some command also.  No homicidal ideations.  No drug use etc.  Patient presents the ER cooperative voluntary.  Valuation patient is appropriate for mental health admission awaiting for bed to become available.  I did write patient for his medications which he does take Depakote and Zyprexa. Sign out to night MD in ER. Patient is voluntary.         I have reviewed the nursing notes.    I have reviewed the findings, diagnosis, plan and need for follow up with the patient.    New Prescriptions    No medications on file       Final diagnoses:   Suicidal ideation   Schizoaffective disorder, bipolar type (H)       10/16/2019   [facility], [place], EMERGENCY  DEPARTMENT    Amandeep Arreaga MD  10/17/19 0105    Amandeep Arreaga MD  10/17/19 0106

## 2019-10-16 NOTE — ED NOTES
I have performed an in person assessment of the patient. Based on this assessment the patient no longer requires a one on one attendant at this point in time.    Amandeep Arreaga MD  3:34 PM  October 16, 2019         Amandeep Arreaga MD  10/16/19 1534

## 2019-10-17 PROBLEM — F29 PSYCHOSIS (H): Status: ACTIVE | Noted: 2019-10-17

## 2019-10-17 LAB
ALBUMIN SERPL-MCNC: 3.4 G/DL (ref 3.4–5)
ALP SERPL-CCNC: 59 U/L (ref 40–150)
ALT SERPL W P-5'-P-CCNC: 38 U/L (ref 0–70)
AMPHETAMINES UR QL SCN: NEGATIVE
ANION GAP SERPL CALCULATED.3IONS-SCNC: 6 MMOL/L (ref 3–14)
AST SERPL W P-5'-P-CCNC: 14 U/L (ref 0–45)
BARBITURATES UR QL: NEGATIVE
BASOPHILS # BLD AUTO: 0 10E9/L (ref 0–0.2)
BASOPHILS NFR BLD AUTO: 0.5 %
BENZODIAZ UR QL: NEGATIVE
BILIRUB SERPL-MCNC: 0.5 MG/DL (ref 0.2–1.3)
BUN SERPL-MCNC: 13 MG/DL (ref 7–30)
CALCIUM SERPL-MCNC: 8 MG/DL (ref 8.5–10.1)
CANNABINOIDS UR QL SCN: NEGATIVE
CHLORIDE SERPL-SCNC: 108 MMOL/L (ref 94–109)
CO2 SERPL-SCNC: 25 MMOL/L (ref 20–32)
COCAINE UR QL: NEGATIVE
CREAT SERPL-MCNC: 0.82 MG/DL (ref 0.66–1.25)
DIFFERENTIAL METHOD BLD: NORMAL
EOSINOPHIL # BLD AUTO: 0.2 10E9/L (ref 0–0.7)
EOSINOPHIL NFR BLD AUTO: 2.7 %
ERYTHROCYTE [DISTWIDTH] IN BLOOD BY AUTOMATED COUNT: 13.3 % (ref 10–15)
ETHANOL UR QL SCN: NEGATIVE
GFR SERPL CREATININE-BSD FRML MDRD: >90 ML/MIN/{1.73_M2}
GLUCOSE SERPL-MCNC: 76 MG/DL (ref 70–99)
HCT VFR BLD AUTO: 47.4 % (ref 40–53)
HGB BLD-MCNC: 16.2 G/DL (ref 13.3–17.7)
IMM GRANULOCYTES # BLD: 0 10E9/L (ref 0–0.4)
IMM GRANULOCYTES NFR BLD: 0.2 %
LYMPHOCYTES # BLD AUTO: 2.2 10E9/L (ref 0.8–5.3)
LYMPHOCYTES NFR BLD AUTO: 39.3 %
MCH RBC QN AUTO: 32.9 PG (ref 26.5–33)
MCHC RBC AUTO-ENTMCNC: 34.2 G/DL (ref 31.5–36.5)
MCV RBC AUTO: 96 FL (ref 78–100)
MONOCYTES # BLD AUTO: 0.7 10E9/L (ref 0–1.3)
MONOCYTES NFR BLD AUTO: 12 %
NEUTROPHILS # BLD AUTO: 2.5 10E9/L (ref 1.6–8.3)
NEUTROPHILS NFR BLD AUTO: 45.3 %
NRBC # BLD AUTO: 0 10*3/UL
NRBC BLD AUTO-RTO: 0 /100
OPIATES UR QL SCN: NEGATIVE
PLATELET # BLD AUTO: 206 10E9/L (ref 150–450)
POTASSIUM SERPL-SCNC: 4.4 MMOL/L (ref 3.4–5.3)
PROT SERPL-MCNC: 6.4 G/DL (ref 6.8–8.8)
RBC # BLD AUTO: 4.92 10E12/L (ref 4.4–5.9)
SODIUM SERPL-SCNC: 139 MMOL/L (ref 133–144)
WBC # BLD AUTO: 5.5 10E9/L (ref 4–11)

## 2019-10-17 PROCEDURE — 12400001 ZZH R&B MH UMMC

## 2019-10-17 PROCEDURE — 80307 DRUG TEST PRSMV CHEM ANLYZR: CPT | Performed by: FAMILY MEDICINE

## 2019-10-17 PROCEDURE — 80053 COMPREHEN METABOLIC PANEL: CPT | Performed by: EMERGENCY MEDICINE

## 2019-10-17 PROCEDURE — 80320 DRUG SCREEN QUANTALCOHOLS: CPT | Performed by: FAMILY MEDICINE

## 2019-10-17 PROCEDURE — 85025 COMPLETE CBC W/AUTO DIFF WBC: CPT | Performed by: EMERGENCY MEDICINE

## 2019-10-17 PROCEDURE — 25000132 ZZH RX MED GY IP 250 OP 250 PS 637: Performed by: STUDENT IN AN ORGANIZED HEALTH CARE EDUCATION/TRAINING PROGRAM

## 2019-10-17 PROCEDURE — 25000132 ZZH RX MED GY IP 250 OP 250 PS 637: Performed by: FAMILY MEDICINE

## 2019-10-17 RX ORDER — HYDROXYZINE HYDROCHLORIDE 25 MG/1
25 TABLET, FILM COATED ORAL EVERY 4 HOURS PRN
Status: DISCONTINUED | OUTPATIENT
Start: 2019-10-17 | End: 2019-10-21 | Stop reason: HOSPADM

## 2019-10-17 RX ORDER — DIVALPROEX SODIUM 500 MG/1
2000 TABLET, EXTENDED RELEASE ORAL AT BEDTIME
Status: DISCONTINUED | OUTPATIENT
Start: 2019-10-17 | End: 2019-10-21 | Stop reason: HOSPADM

## 2019-10-17 RX ORDER — DIVALPROEX SODIUM 500 MG/1
1500 TABLET, EXTENDED RELEASE ORAL AT BEDTIME
Status: DISCONTINUED | OUTPATIENT
Start: 2019-10-17 | End: 2019-10-17 | Stop reason: DRUGHIGH

## 2019-10-17 RX ORDER — ACETAMINOPHEN 325 MG/1
650 TABLET ORAL EVERY 4 HOURS PRN
Status: DISCONTINUED | OUTPATIENT
Start: 2019-10-17 | End: 2019-10-21 | Stop reason: HOSPADM

## 2019-10-17 RX ORDER — TRAZODONE HYDROCHLORIDE 50 MG/1
50 TABLET, FILM COATED ORAL
Status: DISCONTINUED | OUTPATIENT
Start: 2019-10-17 | End: 2019-10-21 | Stop reason: HOSPADM

## 2019-10-17 RX ORDER — BENZTROPINE MESYLATE 1 MG/1
1 TABLET ORAL AT BEDTIME
Status: DISCONTINUED | OUTPATIENT
Start: 2019-10-17 | End: 2019-10-21 | Stop reason: HOSPADM

## 2019-10-17 RX ORDER — OLANZAPINE 10 MG/1
10 TABLET ORAL
Status: DISCONTINUED | OUTPATIENT
Start: 2019-10-17 | End: 2019-10-21 | Stop reason: HOSPADM

## 2019-10-17 RX ORDER — OLANZAPINE 15 MG/1
15 TABLET ORAL AT BEDTIME
Status: DISCONTINUED | OUTPATIENT
Start: 2019-10-17 | End: 2019-10-17 | Stop reason: DRUGHIGH

## 2019-10-17 RX ORDER — OLANZAPINE 10 MG/1
10 TABLET ORAL AT BEDTIME
Status: ON HOLD | COMMUNITY
End: 2019-10-21

## 2019-10-17 RX ORDER — BENZTROPINE MESYLATE 1 MG/1
1 TABLET ORAL AT BEDTIME
Status: ON HOLD | COMMUNITY
End: 2019-10-21

## 2019-10-17 RX ORDER — OLANZAPINE 20 MG/1
20 TABLET ORAL AT BEDTIME
Status: ON HOLD | COMMUNITY
End: 2019-10-21

## 2019-10-17 RX ORDER — ALUMINA, MAGNESIA, AND SIMETHICONE 2400; 2400; 240 MG/30ML; MG/30ML; MG/30ML
30 SUSPENSION ORAL EVERY 4 HOURS PRN
Status: DISCONTINUED | OUTPATIENT
Start: 2019-10-17 | End: 2019-10-21 | Stop reason: HOSPADM

## 2019-10-17 RX ORDER — POLYETHYLENE GLYCOL 3350 17 G
2-4 POWDER IN PACKET (EA) ORAL
Status: DISCONTINUED | OUTPATIENT
Start: 2019-10-17 | End: 2019-10-21 | Stop reason: HOSPADM

## 2019-10-17 RX ORDER — OLANZAPINE 10 MG/1
20 TABLET ORAL AT BEDTIME
Status: DISCONTINUED | OUTPATIENT
Start: 2019-10-17 | End: 2019-10-21 | Stop reason: HOSPADM

## 2019-10-17 RX ORDER — OLANZAPINE 10 MG/2ML
10 INJECTION, POWDER, FOR SOLUTION INTRAMUSCULAR
Status: DISCONTINUED | OUTPATIENT
Start: 2019-10-17 | End: 2019-10-21 | Stop reason: HOSPADM

## 2019-10-17 RX ADMIN — OLANZAPINE 20 MG: 10 TABLET, FILM COATED ORAL at 22:02

## 2019-10-17 RX ADMIN — NICOTINE POLACRILEX 4 MG: 2 LOZENGE ORAL at 22:02

## 2019-10-17 RX ADMIN — BENZTROPINE MESYLATE 1 MG: 1 TABLET ORAL at 22:02

## 2019-10-17 RX ADMIN — DIVALPROEX SODIUM 2000 MG: 500 TABLET, EXTENDED RELEASE ORAL at 22:02

## 2019-10-17 RX ADMIN — OLANZAPINE 15 MG: 5 TABLET, FILM COATED ORAL at 01:42

## 2019-10-17 RX ADMIN — DIVALPROEX SODIUM 1500 MG: 500 TABLET, EXTENDED RELEASE ORAL at 01:42

## 2019-10-17 ASSESSMENT — ACTIVITIES OF DAILY LIVING (ADL)
FALL_HISTORY_WITHIN_LAST_SIX_MONTHS: NO
BATHING: 0-->INDEPENDENT
RETIRED_COMMUNICATION: 0-->UNDERSTANDS/COMMUNICATES WITHOUT DIFFICULTY
AMBULATION: 0-->INDEPENDENT
PRIOR_FUNCTIONAL_LEVEL_COMMENT: FAIR
RETIRED_EATING: 0-->INDEPENDENT
TOILETING: 0-->INDEPENDENT
SWALLOWING: 0-->SWALLOWS FOODS/LIQUIDS WITHOUT DIFFICULTY
COGNITION: 0 - NO COGNITION ISSUES REPORTED
TRANSFERRING: 0-->INDEPENDENT
DRESS: 0-->INDEPENDENT

## 2019-10-17 NOTE — ED NOTES
"ED to Behavioral Floor Handoff    SITUATION  Bertram Woods is a 44 year old male who speaks English and lives in a group home with others The patient arrived in the ED by ambulance from home with a complaint of Suicidal (Client planning to jump off of bridge with intention today. Having suicidal thoughts for three days. ) and Hallucinations (Hearing voices, they tell pt \"you're worseless\" and telling pt to do harmful things to self. )  .The patient's current symptoms started/worsened 2 week(s) ago and during this time the symptoms have increased.   In the ED, pt was diagnosed with   Final diagnoses:   Suicidal ideation   Schizoaffective disorder, bipolar type (H)        Initial vitals were: BP: 123/81  Pulse: 82  Heart Rate: 88  Temp: 95.9  F (35.5  C)  Resp: 16  Weight: 96.2 kg (212 lb)  SpO2: 97 %   --------  Is the patient diabetic? No   If yes, last blood glucose? --     If yes, was this treated in the ED? --  --------  Is the patient inebriated (ETOH) No or Impaired on other substances? No  MSSA done? No  Last MSSA score: --    Were withdrawal symptoms treated? N/A  Does the patient have a seizure history? No. If yes, date of most recent seizure--  --------  Is the patient patient experiencing suicidal ideation? reports suicidal ideation with out intention or a suicidal plan    Homicidal ideation? denies current or recent homicidal ideation or behaviors.    Self-injurious behavior/urges? denies current or recent self injurious behavior or ideation.  ------  Was pt aggressive in the ED No  Was a code called No  Is the pt now cooperative? Yes  -------  Meds given in ED:   Medications   divalproex sodium extended-release (DEPAKOTE ER) 24 hr tablet 1,500 mg (1,500 mg Oral Given 10/17/19 0142)   OLANZapine (zyPREXA) tablet 15 mg (15 mg Oral Given 10/17/19 0142)      Family present during ED course? No  Family currently present? No    BACKGROUND  Does the patient have a cognitive impairment or developmental " disability? No  Allergies:   Allergies   Allergen Reactions     Amoxicillin Rash     Penicillins Rash   .   Social demographics are   Social History     Socioeconomic History     Marital status: Single     Spouse name: None     Number of children: None     Years of education: None     Highest education level: None   Occupational History     None   Social Needs     Financial resource strain: None     Food insecurity:     Worry: None     Inability: None     Transportation needs:     Medical: None     Non-medical: None   Tobacco Use     Smoking status: Current Every Day Smoker     Packs/day: 0.25     Types: Cigarettes     Smokeless tobacco: Never Used   Substance and Sexual Activity     Alcohol use: No     Drug use: No     Sexual activity: None   Lifestyle     Physical activity:     Days per week: None     Minutes per session: None     Stress: None   Relationships     Social connections:     Talks on phone: None     Gets together: None     Attends Latter day service: None     Active member of club or organization: None     Attends meetings of clubs or organizations: None     Relationship status: None     Intimate partner violence:     Fear of current or ex partner: None     Emotionally abused: None     Physically abused: None     Forced sexual activity: None   Other Topics Concern     None   Social History Narrative     None        ASSESSMENT  Labs results   Labs Ordered and Resulted from Time of ED Arrival Up to the Time of Departure from the ED   DRUG ABUSE SCREEN 6 CHEM DEP URINE ([facility])   CBC WITH PLATELETS DIFFERENTIAL   COMPREHENSIVE METABOLIC PANEL      Imaging Studies: No results found for this or any previous visit (from the past 24 hour(s)).   Most recent vital signs /65   Pulse 88   Temp 97.5  F (36.4  C) (Oral)   Resp 16   Wt 96.2 kg (212 lb)   SpO2 95%   BMI 28.75 kg/m     Abnormal labs/tests/findings requiring intervention:---   Pain control: pt had none  Nausea control: pt had  none    RECOMMENDATION  Are any infection precautions needed (MRSA, VRE, etc.)? No If yes, what infection? --  ---  Does the patient have mobility issues? independently. If yes, what device does the pt use? ---  ---  Is patient on 72 hour hold or commitment? No If on 72 hour hold, have hold and rights been given to patient? N/A  Are admitting orders written if after 10 p.m. ?N/A  Tasks needing to be completed:---     Aure Benton, RN   [facility]--    [phone] [facility]   [phone] [facility]

## 2019-10-17 NOTE — ED NOTES
Patient signed out to me by Dr. Arreaga    Patient is a 44-year-old male with a past medical history of schizoaffective disorder here with auditory hallucinations and suicide ideation with a plan to jump off a bridge.  Patient pending inpatient psychiatric admission on a voluntary basis.    No acute events during my shift.  Patient signed out to morning provider.     Rosanne Painter MD  10/17/19 0638

## 2019-10-17 NOTE — ED NOTES
Patient signed out to me by my partner awaiting psychiatric bed assignment.  There are no acute issues throughout my shift.  Patient will be signed out to my partner awaiting psychiatric bed assignment.     Maryellen Houston MD  10/17/19 2816
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
## 2019-10-17 NOTE — PHARMACY-ADMISSION MEDICATION HISTORY
Admission medication history interview status for the 10/16/2019 admission is complete. See Epic admission navigator for allergy information, pharmacy, prior to admission medications and immunization status.     Medication history interview sources:  Patient, [facility] pharmacy ([phone])    Changes made to PTA medication list (reason)  Added: benztropine (per pharmacy)  Deleted: naproxen prn  Changed:   -olanzapine 15 mg by mouth at bedtime --> 30 mg by mouth at bedtime (per pharmacy)  -depakote 1500 mg by mouth at bedtime --> 2000 mg by mouth at bedtime (per pharmacy)    Additional medication history information (including reliability of information, actions taken by pharmacist):  -Patient stated he takes depakote 1500 mg and olanzapine 15 mg at bedtime. At the end of the interview he said he thought the olanzapine might actually be 30 mg. He did not mention taking benztropine. Called patient's group home ([facility] [phone]) and they were unable to provide any information on his medications without a release of information but were able to give me the pharmacy they were filled at. Called pharmacy to verify medications and doses and they had depakote ER 2000 mg at bedtime, olanzapine 30 mg at bedtime (combination of 10 mg and 20 mg tablet), and benztropine 1 mg at bedtime on file. Unsure if patient is taking medications as prescribed.      Prior to Admission medications    Medication Sig Last Dose Taking? Auth Provider   divalproex sodium extended-release (DEPAKOTE ER) 500 MG 24 hr tablet Take 2,000 mg by mouth At Bedtime  10/16/2019 at Unknown time Yes Reported, Patient   OLANZapine (ZYPREXA) 10 MG tablet Take 10 mg by mouth At Bedtime Take with 20 mg for total dose of 30 mg 10/16/2019 at Unknown time Yes Unknown, Entered By History   OLANZapine (ZYPREXA) 20 MG tablet Take 20 mg by mouth At Bedtime Take with 10 mg for a total dose of 30 mg 10/16/2019 at Unknown time Yes Unknown, Entered By History    benztropine (COGENTIN) 1 MG tablet Take 1 mg by mouth At Bedtime Unknown at Unknown time  Unknown, Entered By History         Medication history completed by: Susan Rose, PharmPaulinoD.

## 2019-10-18 LAB
INTERPRETATION ECG - MUSE: NORMAL
VALPROATE SERPL-MCNC: 81 MG/L (ref 50–100)

## 2019-10-18 PROCEDURE — 93005 ELECTROCARDIOGRAM TRACING: CPT

## 2019-10-18 PROCEDURE — 93010 ELECTROCARDIOGRAM REPORT: CPT | Performed by: INTERNAL MEDICINE

## 2019-10-18 PROCEDURE — 12400001 ZZH R&B MH UMMC

## 2019-10-18 PROCEDURE — 80164 ASSAY DIPROPYLACETIC ACD TOT: CPT | Performed by: STUDENT IN AN ORGANIZED HEALTH CARE EDUCATION/TRAINING PROGRAM

## 2019-10-18 PROCEDURE — 25000132 ZZH RX MED GY IP 250 OP 250 PS 637: Mod: GY | Performed by: STUDENT IN AN ORGANIZED HEALTH CARE EDUCATION/TRAINING PROGRAM

## 2019-10-18 PROCEDURE — 36415 COLL VENOUS BLD VENIPUNCTURE: CPT | Performed by: STUDENT IN AN ORGANIZED HEALTH CARE EDUCATION/TRAINING PROGRAM

## 2019-10-18 PROCEDURE — H2032 ACTIVITY THERAPY, PER 15 MIN: HCPCS

## 2019-10-18 PROCEDURE — 99223 1ST HOSP IP/OBS HIGH 75: CPT | Mod: AI | Performed by: PSYCHIATRY & NEUROLOGY

## 2019-10-18 RX ADMIN — OLANZAPINE 20 MG: 10 TABLET, FILM COATED ORAL at 21:17

## 2019-10-18 RX ADMIN — ACETAMINOPHEN 650 MG: 325 TABLET, FILM COATED ORAL at 14:59

## 2019-10-18 RX ADMIN — NICOTINE POLACRILEX 4 MG: 2 LOZENGE ORAL at 19:19

## 2019-10-18 RX ADMIN — NICOTINE POLACRILEX 4 MG: 2 LOZENGE ORAL at 13:35

## 2019-10-18 RX ADMIN — NICOTINE POLACRILEX 4 MG: 2 LOZENGE ORAL at 20:53

## 2019-10-18 RX ADMIN — DIVALPROEX SODIUM 2000 MG: 500 TABLET, EXTENDED RELEASE ORAL at 20:46

## 2019-10-18 RX ADMIN — NICOTINE POLACRILEX 4 MG: 2 LOZENGE ORAL at 11:28

## 2019-10-18 RX ADMIN — BENZTROPINE MESYLATE 1 MG: 1 TABLET ORAL at 20:46

## 2019-10-18 RX ADMIN — NICOTINE POLACRILEX 4 MG: 2 LOZENGE ORAL at 16:53

## 2019-10-18 ASSESSMENT — ACTIVITIES OF DAILY LIVING (ADL)
HYGIENE/GROOMING: INDEPENDENT
LAUNDRY: WITH SUPERVISION
ORAL_HYGIENE: INDEPENDENT
ORAL_HYGIENE: INDEPENDENT
HYGIENE/GROOMING: HANDWASHING;SHOWER;INDEPENDENT
DRESS: INDEPENDENT;SCRUBS (BEHAVIORAL HEALTH)
DRESS: SCRUBS (BEHAVIORAL HEALTH);INDEPENDENT
LAUNDRY: WITH SUPERVISION
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
## 2019-10-18 NOTE — PLAN OF CARE
"The patient specific goals include:   Patient will participate in unit programming  Patient will identify triggers and positive coping skills  Patient will take medications as prescribed by physician both for mental health and medical  Patient coached to work on coping packet    The patient identified the following reasons for hospitalization:  \"I wanted to die\"    The patient identified the following goals for discharge:  Medication management  Possibly IRTS referral  "

## 2019-10-18 NOTE — PROGRESS NOTES
10/18/19 1800   General Information   Art Directive other (see comments)   AT directive is to create an image of self as landscape using metaphor and imagery to express aspects of self. Goals of directive: creating a personal narrative, emotional expression, identifying personal strengths and goals.  Pt was a positive participant, focused on task for the full duration of group. 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 Pt said he enjoyed the work, despite a few injuries over the years.  Pts mood was calm, focused on task.

## 2019-10-18 NOTE — PLAN OF CARE
BEHAVIORAL TEAM DISCUSSION    Participants:   Dr. Womack, Attending Psychiatrist  Laurel Bull MD PGY-1  Rebecca Walsh, [facility], LADC, CTC  Denia Alberto, RN  Vickie Rust, MS3  Gage Mehta, MS3  Progress: Initial  Anticipated length of stay: Undetermined  Continued Stay Criteria/Rationale: Increasing auditory hallucinations and suicidal ideation.   Medical/Physical: No acute issues  Precautions:   Behavioral Orders   Procedures     Code 1 - Restrict to Unit     Routine Programming     As clinically indicated     Status 15     Every 15 minutes.     Suicide precautions     Patients on Suicide Precautions should have a Combination Diet ordered that includes a Diet selection(s) AND a Behavioral Tray selection for Safe Tray - with utensils, or Safe Tray - NO utensils     Plan: The plan is to assess and patient for mental health and medication needs.  The patient will be prescribed medications to treat the identified symptoms.  Upon discharge the patient will be referred to services as appropriate based on the assessment.  Rationale for change in precautions or plan: No change at present-will continue to monitor and adjust as needed
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
## 2019-10-18 NOTE — PROGRESS NOTES
10/17/19 2157   Patient Belongings   Did you bring any home meds/supplements to the hospital?  No   Patient Belongings locker;remains with patient   Patient Belongings Remaining with Patient clothing;glasses   Patient Belongings Put in Hospital Secure Location (Security or Locker, etc.) clothing;shoes   Belongings Search Yes   Clothing Search Yes   Second Staff Marquise (st. [address])     Items brought on admission (10/17/19):   Yellow hoodie, black zip-up sweat shirt, shoes, hat, jeans, long sleeve shirt, lighter, underwear, and socks. Visa card ending in [phone] and EBT card ending in [phone] sent to security in envelope #[id number].         A               Admission:  I am responsible for any personal items that are not sent to the safe or pharmacy.  [facility] is not responsible for loss, theft or damage of any property in my possession.    Signature:  _________________________________ Date: _______  Time: _____                                              Staff Signature:  ____________________________ Date: ________  Time: _____      2nd Staff person, if patient is unable/unwilling to sign:    Signature: ________________________________ Date: ________  Time: _____     Discharge:  [facility] has returned all of my personal belongings:    Signature: _________________________________ Date: ________  Time: _____                                          Staff Signature:  ____________________________ Date: ________  Time: _____

## 2019-10-19 PROCEDURE — H2032 ACTIVITY THERAPY, PER 15 MIN: HCPCS

## 2019-10-19 PROCEDURE — 25000132 ZZH RX MED GY IP 250 OP 250 PS 637: Mod: GY | Performed by: STUDENT IN AN ORGANIZED HEALTH CARE EDUCATION/TRAINING PROGRAM

## 2019-10-19 PROCEDURE — 12400001 ZZH R&B MH UMMC

## 2019-10-19 RX ADMIN — NICOTINE POLACRILEX 4 MG: 2 LOZENGE ORAL at 11:27

## 2019-10-19 RX ADMIN — NICOTINE POLACRILEX 4 MG: 2 LOZENGE ORAL at 17:01

## 2019-10-19 RX ADMIN — NICOTINE POLACRILEX 4 MG: 2 LOZENGE ORAL at 21:25

## 2019-10-19 RX ADMIN — NICOTINE POLACRILEX 4 MG: 2 LOZENGE ORAL at 13:05

## 2019-10-19 RX ADMIN — OLANZAPINE 20 MG: 10 TABLET, FILM COATED ORAL at 21:08

## 2019-10-19 RX ADMIN — DIVALPROEX SODIUM 2000 MG: 500 TABLET, EXTENDED RELEASE ORAL at 21:08

## 2019-10-19 RX ADMIN — NICOTINE POLACRILEX 4 MG: 2 LOZENGE ORAL at 19:25

## 2019-10-19 RX ADMIN — BENZTROPINE MESYLATE 1 MG: 1 TABLET ORAL at 20:50

## 2019-10-19 RX ADMIN — NICOTINE POLACRILEX 4 MG: 2 LOZENGE ORAL at 15:19

## 2019-10-19 ASSESSMENT — ACTIVITIES OF DAILY LIVING (ADL)
HYGIENE/GROOMING: INDEPENDENT
ORAL_HYGIENE: INDEPENDENT
DRESS: INDEPENDENT
LAUNDRY: WITH SUPERVISION

## 2019-10-19 NOTE — PLAN OF CARE
"Bertram checked in with this nurse briefly. He denies A/V hallucinations. Said \"I talk to myself, but I do that a lot.\" He denies SI/HI. Says that he feels \"good.\" He wants to talk to the team Monday about perspective discharge to an IRTS. He attended an art group earlier and participated to complete a painting.  "

## 2019-10-19 NOTE — PROGRESS NOTES
Observed to have slept well for approx 6.5 hrs overnight.  Wakeful mid shift, requested and was provided a snack.  Readily returned to bed and back to sleep w/ regular non-labored respirations.  Of note, has been observed during the past several  nights w/ brief occasional shaking of the left foot vs muscle twitching undetermined as to whether this is voluntary or involuntarty.  Will continue to monitor.  Safe, therapeutic environment maintained.

## 2019-10-19 NOTE — PROGRESS NOTES
10/19/19 4696   Behavioral Health   Hallucinations denies / not responding to hallucinations   Thinking poor concentration   Orientation place: oriented   Insight poor   Judgement impaired   Eye Contact at examiner   Affect other (see comments)  (neutral)   Mood mood is calm   Physical Appearance/Attire disheveled   Hygiene well groomed   Suicidality other (see comments)  (Denies)   1. Wish to be Dead (Past Month) No   2. Non-Specific Active Suicidal Thoughts (Past Month) No   Self Injury other (see comment)  (None observed or reported )   Elopement   (None observed or reported )   Activity withdrawn   Speech clear   Medication Sensitivity no stated side effects   Psychomotor / Gait balanced     Pt's affect is neutral. He was in and out of his room. Behavior is calm and controlled. Observed to be resting for most of morning. Watched tv, mostly keeps to himself, and attended a group. His goal is to shower later in the day. Denies thoughts to hurt himself or others. Contracts for safety.

## 2019-10-20 PROCEDURE — 12400001 ZZH R&B MH UMMC

## 2019-10-20 PROCEDURE — 90853 GROUP PSYCHOTHERAPY: CPT

## 2019-10-20 PROCEDURE — 25000132 ZZH RX MED GY IP 250 OP 250 PS 637: Mod: GY | Performed by: STUDENT IN AN ORGANIZED HEALTH CARE EDUCATION/TRAINING PROGRAM

## 2019-10-20 RX ADMIN — DIVALPROEX SODIUM 2000 MG: 500 TABLET, EXTENDED RELEASE ORAL at 20:33

## 2019-10-20 RX ADMIN — NICOTINE POLACRILEX 4 MG: 2 LOZENGE ORAL at 15:42

## 2019-10-20 RX ADMIN — OLANZAPINE 20 MG: 10 TABLET, FILM COATED ORAL at 20:33

## 2019-10-20 RX ADMIN — NICOTINE POLACRILEX 4 MG: 2 LOZENGE ORAL at 18:29

## 2019-10-20 RX ADMIN — BENZTROPINE MESYLATE 1 MG: 1 TABLET ORAL at 20:33

## 2019-10-20 RX ADMIN — NICOTINE POLACRILEX 4 MG: 2 LOZENGE ORAL at 14:16

## 2019-10-20 RX ADMIN — NICOTINE POLACRILEX 4 MG: 2 LOZENGE ORAL at 16:55

## 2019-10-20 RX ADMIN — NICOTINE POLACRILEX 4 MG: 2 LOZENGE ORAL at 19:46

## 2019-10-20 RX ADMIN — NICOTINE POLACRILEX 4 MG: 2 LOZENGE ORAL at 12:58

## 2019-10-20 ASSESSMENT — ACTIVITIES OF DAILY LIVING (ADL)
ORAL_HYGIENE: INDEPENDENT
DRESS: SCRUBS (BEHAVIORAL HEALTH)
DRESS: SCRUBS (BEHAVIORAL HEALTH)
ORAL_HYGIENE: INDEPENDENT
LAUNDRY: WITH SUPERVISION
HYGIENE/GROOMING: INDEPENDENT
HYGIENE/GROOMING: INDEPENDENT

## 2019-10-20 NOTE — PROGRESS NOTES
Rec'd pt. Sleeping well w/o observable distress as shift commenced.  Respirations regular and unlabored. Wakeful, up requesting a snack at approx 0045.  Same provided.  Spoke w/ pt. Re: previous and briefly observed shaking of the left foot during sleep.   Endorsed his awareness and a hx of Restless Leg Syndrome.  Pleasantly conversant.  Readily back to bed and back to sleep w/o difficulty.  Continuing to monitor.

## 2019-10-20 NOTE — PROGRESS NOTES
"   10/20/19 9730   Group Therapy Session   Group Attendance attended group session   Total Time (minutes) 45   Group Type psychotherapeutic   Group Topic Covered other (see comments)   Patient Participation/Contribution cooperative with task;discussed personal experience with topic   Psychotherapy goal: Self-reflection through the use of the \"DBT House\" activity.  Bertram presented with flat mood/affect. He participated in the activity and shared his responses with the group. His responses reflected his need for stability such as, housing, income, and good friends. He stated that he knows he needs to be clear about boundaries with friends, letting go of negative relationships.  "

## 2019-10-20 NOTE — PLAN OF CARE
"Bertram slept most of the day yesterday.  He said he did take a shower yesterday and the day before.  He was believable but he still looks very disheveled.  His teeth, mouth, hair very unkempt looking.  He did take shower today at about 1500, washed his hair, changed scrubs and looked good except for his beard of several days and long sparse hairs and his mouth hygiene (yellow teeth, teeth either nicotine stained or yellow) and in need of dental check when back at group home..  Bertram was out more today, watched a good part of the game, was close in around peers but not conversational.  His appearance is put-off-una as mentioned.  He lunched with female peer with minimal conversation but some companionship at the table at least.  He stated that his mood is stable, he is not depressed, he does not feel or think like harming self or others.  He denied any hallucinations when asked on two separate occasions.  He came in with thoughts and \"voices telling me to harm myself.\", but does not have any hallucinations or voices now.  He has no tremor and does not believe he has any side effects to any of his medications.  He is addicted to Nicotine but is very patient and careful about waiting for someone else's needs to be met before his own.  He is kind and could greatly improve his sociability if he had the initiative to clean up his appearance.  He is clean, and takes some care with showers three days in a row and new scrubs, yet he looks unapproachable even so.  Prompts may help although he declined to shave when offered electric razor today.    "

## 2019-10-20 NOTE — PROGRESS NOTES
10/19/19 2200   Art Therapy   Type of Intervention structured groups   Response participates with encouragement   Hours 1   Treatment Detail    (Art Therapy-leaf animals)   Problem- Psychiatric diagnoses:   # Schizoaffective Disorder vs Bipolar Affective Disorder  # History of PTSD       Goal-[place]le, express, regulate, sublimate through Art Therapy directives.     Outcome- Pt was cooperative and quietly engaged. He did a project with fall leaves making flying bugs. The project was to make animals out of leaves. He finished quickly and sat quietly and observed for the rest of group..

## 2019-10-21 VITALS
BODY MASS INDEX: 29.29 KG/M2 | DIASTOLIC BLOOD PRESSURE: 76 MMHG | SYSTOLIC BLOOD PRESSURE: 118 MMHG | OXYGEN SATURATION: 96 % | TEMPERATURE: 97.6 F | RESPIRATION RATE: 15 BRPM | HEART RATE: 74 BPM | WEIGHT: 216 LBS

## 2019-10-21 PROCEDURE — 25000132 ZZH RX MED GY IP 250 OP 250 PS 637: Mod: GY | Performed by: STUDENT IN AN ORGANIZED HEALTH CARE EDUCATION/TRAINING PROGRAM

## 2019-10-21 PROCEDURE — 99238 HOSP IP/OBS DSCHRG MGMT 30/<: CPT | Mod: GC | Performed by: PSYCHIATRY & NEUROLOGY

## 2019-10-21 RX ORDER — OLANZAPINE 20 MG/1
20 TABLET ORAL AT BEDTIME
Qty: 30 TABLET | Refills: 1 | Status: ON HOLD | OUTPATIENT
Start: 2019-10-21 | End: 2019-10-31

## 2019-10-21 RX ORDER — BENZTROPINE MESYLATE 1 MG/1
1 TABLET ORAL AT BEDTIME
Qty: 30 TABLET | Refills: 3 | Status: ON HOLD | OUTPATIENT
Start: 2019-10-21 | End: 2019-10-31

## 2019-10-21 RX ORDER — DIVALPROEX SODIUM 500 MG/1
2000 TABLET, EXTENDED RELEASE ORAL AT BEDTIME
Qty: 120 TABLET | Refills: 1 | Status: ON HOLD | OUTPATIENT
Start: 2019-10-21 | End: 2019-10-31

## 2019-10-21 RX ORDER — POLYETHYLENE GLYCOL 3350 17 G
2-4 POWDER IN PACKET (EA) ORAL
Qty: 60 LOZENGE | Refills: 1 | Status: ON HOLD | OUTPATIENT
Start: 2019-10-21 | End: 2019-10-31

## 2019-10-21 RX ADMIN — NICOTINE POLACRILEX 4 MG: 2 LOZENGE ORAL at 13:00

## 2019-10-21 RX ADMIN — NICOTINE POLACRILEX 4 MG: 2 LOZENGE ORAL at 11:04

## 2019-10-21 ASSESSMENT — ACTIVITIES OF DAILY LIVING (ADL)
ORAL_HYGIENE: INDEPENDENT
DRESS: INDEPENDENT
HYGIENE/GROOMING: INDEPENDENT
LAUNDRY: WITH SUPERVISION

## 2019-10-21 NOTE — PROGRESS NOTES
"    ----------------------------------------------------------------------------------------------------------  [facility], [place]   Psychiatric Progress Note  Hospital Day #4     Interim History:   The patient's care was discussed with the treatment team and chart notes were reviewed.    Sleep: 5.5 hours(woke up and had snack) (10/21/19 0600)  Scheduled Medications: Adherent  PRN Medications: Nicotine lozenge 2-4 mg    Staff Report: Bertram showered today, though he still appears disheveled (unshaven, etc.). His affect remains flat. He kept postponing 1:1 conversation until he finally went to bed. Then, he declined. Denies any symptoms and asks when he might be discharged. Referred to team tomorrow. He attended the art therapy group [facility].     Patient Interview: Pt was interviewed in the conference room on station 22. Pt described mood as \"much better\" and denied AH, VH, SI, SIB, and HI. He has been sleeping \"ok\", but is having symptoms of \"RLS\", per staff report. Pt does not wake up from \"kicking legs\" and is not interested in treatment at this time. He hopes to discharge today. Writer asked about where the pt intends to stay if discharged. Pt will seek shelter at [facility]. Crisis plan discussed with care team. Pt will \"call friends\" and \"return to the ED\" if symptoms return. Pt will be discharged this afternoon.     The risks, benefits, alternatives and side effects of any medication changes have been discussed and are understood by the patient and other caregivers.    Review of systems:     ROS was negative unless noted above.          Allergies:     Allergies   Allergen Reactions     Amoxicillin Rash     Penicillins Rash            Psychiatric Examination:   /86 (BP Location: Right arm)   Pulse 96   Temp 98.1  F (36.7  C) (Oral)   Resp 15   Wt 98 kg (216 lb)   SpO2 95%   BMI 29.29 kg/m    Weight is 216 lbs 0 oz  Body mass index is 29.29 " kg/m .    Appearance:  awake, alert and slightly unkempt  Attitude:  cooperative  Eye Contact:  better  Mood:  better  Affect:  appropriate and in normal range and intensity is normal  Speech:  clear, coherent  Psychomotor Behavior:  no evidence of tardive dyskinesia, dystonia, or tics  Thought Process:  logical and linear  Associations:  no loose associations  Thought Content:  no evidence of suicidal ideation or homicidal ideation  Insight:  limited  Judgment:  intact  Oriented to:  time, person, and place  Attention Span and Concentration:  fair  Recent and Remote Memory:  fair  Language: speaks fluent english  Fund of Knowledge: Appropriate  Muscle Strength and Tone: grossly normal  Gait and Station: Normal         Labs:     No results found for this or any previous visit (from the past 24 hour(s)).     Assessment    Principal Diagnosis:   # Schizoaffective Disorder vs Bipolar Affective Disorder     Secondary psychiatric diagnoses of concern this admission:   # PTSD    Diagnostic Impression: Bertram Woods is a 44 year old male previously diagnosed with schizoaffective disorder, bipolar disorder and PTSD who presents with increasing auditory hallucinations and suicidal ideation in the context of increasing depression and command auditory hallucinations telling him to jump off a bridge.  Patient reports medication noncompliance d/t difficulty acquiring medications outpatient. Of note, patient was hospitalized approximately 1 year ago, at that time with SI and plan to jump off a bridge citing having flashbacks related to his mother's murder/suicide by boyfriend, unclear if this is an anniversary date or not.  Substances are likely not a contributing factor to his presentation, UDS negative on admission.  Biological contributions to mental health presentation include history of multiple traumas, chronic mental illness, and inconsistent medication compliance. Psychosocial factors contributing to current  presentation include transient living situation, history of trauma, chronic mental illness, and difficulty seeking care.  The patient's presentation is consistent with schizoaffective disorder versus bipolar affective disorder.  Medication compliance likely complicated by h/o homelessness and difficulty accessing consistent psychiatric care  In the community. Patient would likely benefit from RAMIREZ however indicated poor experience w/ Prolexin in the past.     Hospital course: Bertram Woods was admitted to station 22 as a voluntary patient on 10/17/19, admitted  from [facility] ED where he received 15 mg Olanzapine in ED for agitation/psychosis. PTA Depakote 2000 mg qPM, Zyprexa 20mg qPM, and benztropine 1 mg qPM re-started. 10/21 Pt discharged, PTA medications ordered.    Discontinued Medications: None    Medical course: Pt was medically cleared by the emergency department PTA. Labs on admission (UDS, CBC w/PLT Diff, and CMP) were within normal limits.    Plan   Today's changes:  - None    Psychotropic Medications:  Scheduled Meds:  - Benztropine 1 mg at bedtime qPM  - Divalproex sodium ER 24 hr tablet 2000 mg qPM  - Olanzapine tablet 20 mg qPM    PRN Meds:  - Hydroxyzine tablet 25 mg  - Olanzapine tablet 10 mg  - Olanzapine injection 10 mg  - Trazodone tablet 50 mg    Patient will be treated in therapeutic milieu with appropriate individual and group therapies as described.    Medical diagnoses to be addressed this admission: None    Data:   - EKG wnl    Consults: None    Legal Status: Voluntary    Safety Assessment:   Behavioral Orders   Procedures     Code 1 - Restrict to Unit     Routine Programming     As clinically indicated     Status 15     Every 15 minutes.     Suicide precautions     Patients on Suicide Precautions should have a Combination Diet ordered that includes a Diet selection(s) AND a Behavioral Tray selection for Safe Tray - with utensils, or Safe Tray - NO utensils         Disposition:  Pending stabilization & development of a safe discharge plan.      Scribed by Gage Mehta, MS3  for Laurel Bull MD      ---------------------------------------------------------------------------------------------------------------------  I was present with the medical student who participated in the service and in the documentation of the note. I have verified the history and personally performed the physical exam and medical decision making. I agree with the assessment and plan of care as documented in the note.    Laurel Bull MD  PGY1 Psychiatry

## 2019-10-21 NOTE — PLAN OF CARE
Bertram showered today, though he still appears disheveled (unshaven, etc.). His affect remains flat. He kept postponing 1:1 conversation until he finally went to bed. Then, he declined. Denies any symptoms and asks when he might be discharged. Referred to team tomorrow. He attended the art therapy group tonight.

## 2019-10-21 NOTE — DISCHARGE SUMMARY
"    Psychiatric Discharge Summary    Hospital Day #4    Bertram Woods MRN# [id number]   Age: 44 year old YOB: 1975     Date of Admission:  10/16/2019  Date of Discharge:  10/21/2019  2:00 PM  Admitting Physician:  Maxime Womcak MD  Discharge Physician:  Maxime Womack MD         Event Leading to Hospitalization:   Bertram Woods is a 44 year old male previously diagnosed with schizoaffective disorder, bipolar affective disorder, and PTSD who presented on 10/17/2019 with increasing auditory hallucinations and suicidal ideation in the context of worsening command auditory hallucinations telling him to jump off a bridge.  Of note, upon further chart review, patient was hospitalized approximately 1 year ago, at that time citing having flashbacks related to his mother's murder/suicide by boyfriend, unclear if this is an anniversary date or not.      Patient notes that auditory hallucinations present over the last couple days. They are ego dystonic, telling him that life is not worth living and leading him to feelings of depression, loneliness, and hopelessness.  Reports the voices tell him to \"jump off a bridge\".  He is unable to identify specific trigger however, as noted above was previously hospitalized at this time for having flashbacks.  Does note that he \"is not taking my meds like I should\" reporting to only take them 2-3 times a week.  However notes that the medications are effective.  Reports his psychiatrist noted EPS including tardive dyskinesia of his tongue this summer as well as \"twiddling\" of his thumbs.  However, denies other side effects from medications.       See Admission note by Dr. Womack found on  10/17/19 for additional details.          Diagnoses:     Principal Diagnosis:   # Schizoaffective Disorder vs Bipolar Affective Disorder      Secondary psychiatric diagnoses of concern this admission:   # PTSD    Diagnostic Impression: Bertram Woods is a 44 year old male " previously diagnosed with schizoaffective disorder, bipolar disorder and PTSD who presents with increasing auditory hallucinations and suicidal ideation in the context of increasing depression and command auditory hallucinations telling him to jump off a bridge.  Patient reports medication noncompliance d/t difficulty acquiring medications outpatient. Of note, patient was hospitalized approximately 1 year ago, at that time with SI and plan to jump off a bridge citing having flashbacks related to his mother's murder/suicide by boyfriend, unclear if this is an anniversary date or not.  Substances are likely not a contributing factor to his presentation, UDS negative on admission.  Biological contributions to mental health presentation include history of multiple traumas, chronic mental illness, and inconsistent medication compliance. Psychosocial factors contributing to current presentation include transient living situation, history of trauma, chronic mental illness, and difficulty seeking care.  The patient's presentation is consistent with schizoaffective disorder versus bipolar affective disorder.  Medication compliance likely complicated by h/o homelessness and difficulty accessing consistent psychiatric care  In the community. Patient would likely benefit from RAMIREZ however indicated poor experience w/ Prolexin in the past, patient requested to discharge without changes to PTA medications.            Consults:     - none         Hospital Course:   Psychiatric Course:  Bertram Woods was admitted to station 22 as a voluntary patient on 10/17/19, admitted  from [facility] ED where he received 15 mg Olanzapine in ED for agitation/psychosis. PTA Depakote 2000 mg qPM, Zyprexa 20mg qPM, and benztropine 1 mg qPM re-started.     Bertram Woods was discharged with resources for homeless shelters. At the time of discharge Bertram Woods was determined to not be a danger to himself or others.    Medical  Course:  Pt was medically cleared by the emergency department PTA. Labs on admission (UDS, CBC w/PLT Diff, and CMP) were within normal limits.      Risk Assessment:  Bertram Woods has notable risk factors for self-harm, including age, single status, psychosis and substance abuse. However, risk is mitigated by ability to volunteer a safety plan and history of seeking help when needed. Therefore, based on all available evidence including the factors cited above, Bertram Woods does not appear to be at imminent risk for self-harm, and is appropriate for outpatient level of care.     This document serves as a transfer of care to Bertram Woods's outpatient providers.         Discharge Medications:     Discharge Medication List as of 10/21/2019 12:09 PM      START taking these medications    Details   nicotine (COMMIT) 2 MG lozenge Place 1-2 lozenges (2-4 mg) inside cheek every hour as needed for other (nicotine withdrawal symptoms), Disp-60 lozenge, R-1, E-Prescribe         CONTINUE these medications which have CHANGED    Details   benztropine (COGENTIN) 1 MG tablet Take 1 tablet (1 mg) by mouth At Bedtime, Disp-30 tablet, R-3, E-Prescribe      divalproex sodium extended-release (DEPAKOTE ER) 500 MG 24 hr tablet Take 4 tablets (2,000 mg) by mouth At Bedtime, Disp-120 tablet, R-1, E-Prescribe      OLANZapine (ZYPREXA) 20 MG tablet Take 1 tablet (20 mg) by mouth At Bedtime, Disp-30 tablet, R-1, E-Prescribe                    Psychiatric Examination:   Appearance:  dressed in hospital scrubs and poorly groomed  Attitude:  cooperative  Eye Contact:  poor to fair  Mood:  neutral  Affect:  intensity is blunted, affect flat  Speech:  clear, coherent  Psychomotor Behavior:  no evidence of tardive dyskinesia, dystonia, or tics  Thought Process:  logical and linear  Associations:  no loose associations  Thought Content:  no evidence of SI, HI, or AH/VH  Insight:  limited  Judgment:  intact  Oriented to:  time,  person, and place  Attention Span and Concentration:  fair  Recent and Remote Memory:  fair  Language: speaks fluent english  Fund of Knowledge: Appropriate  Muscle Strength and Tone: grossly normal  Gait and Station: Normal         Discharge Plan:     Health Care Follow-up Appointments:   Psychiatry Intake - Thursday 10/24/19 at 8:00am  Medication Management and Individual Therapy  [facility] - 2 and 3rd Floors, [address]   [address] [facility], [address] Phone: [phone] fax: [phone]  You must arrive by 8:00am.      [facility]  [address] , [place] [address]  [phone]    Pt seen and discussed with my attending, MD Dr. Laurel Farnsworth MD  PGY-1 Resident        Attestation:   The patient has been seen and evaluated by me,  Maxime Womack MD. I have examined the patient today and reviewed the discharge plan with the resident and medical student. I agree with the final assessment and plan, as noted in the discharge summary. I have reviewed today's vital signs, medications, labs and imaging.  Total time discharge plannin minutes  Maxime Womack MD ,Ph.D.              Appendix A: All Labs This Admission:     Results for orders placed or performed during the hospital encounter of 10/16/19   Drug abuse screen 6 urine (chem dep) ([employer])   Result Value Ref Range    Amphetamine Qual Urine Negative NEG^Negative    Barbiturates Qual Urine Negative NEG^Negative    Benzodiazepine Qual Urine Negative NEG^Negative    Cannabinoids Qual Urine Negative NEG^Negative    Cocaine Qual Urine Negative NEG^Negative    Ethanol Qual Urine Negative NEG^Negative    Opiates Qualitative Urine Negative NEG^Negative   CBC with platelets differential   Result Value Ref Range    WBC 5.5 4.0 - 11.0 10e9/L    RBC Count 4.92 4.4 - 5.9 10e12/L    Hemoglobin 16.2 13.3 - 17.7 g/dL    Hematocrit 47.4 40.0 - 53.0 %    MCV 96 78 - 100 fl    MCH 32.9 26.5 - 33.0 pg     MCHC 34.2 31.5 - 36.5 g/dL    RDW 13.3 10.0 - 15.0 %    Platelet Count 206 150 - 450 10e9/L    Diff Method Automated Method     % Neutrophils 45.3 %    % Lymphocytes 39.3 %    % Monocytes 12.0 %    % Eosinophils 2.7 %    % Basophils 0.5 %    % Immature Granulocytes 0.2 %    Nucleated RBCs 0 0 /100    Absolute Neutrophil 2.5 1.6 - 8.3 10e9/L    Absolute Lymphocytes 2.2 0.8 - 5.3 10e9/L    Absolute Monocytes 0.7 0.0 - 1.3 10e9/L    Absolute Eosinophils 0.2 0.0 - 0.7 10e9/L    Absolute Basophils 0.0 0.0 - 0.2 10e9/L    Abs Immature Granulocytes 0.0 0 - 0.4 10e9/L    Absolute Nucleated RBC 0.0    Comprehensive metabolic panel   Result Value Ref Range    Sodium 139 133 - 144 mmol/L    Potassium 4.4 3.4 - 5.3 mmol/L    Chloride 108 94 - 109 mmol/L    Carbon Dioxide 25 20 - 32 mmol/L    Anion Gap 6 3 - 14 mmol/L    Glucose 76 70 - 99 mg/dL    Urea Nitrogen 13 7 - 30 mg/dL    Creatinine 0.82 0.66 - 1.25 mg/dL    GFR Estimate >90 >60 mL/min/[1.73_m2]    GFR Estimate If Black >90 >60 mL/min/[1.73_m2]    Calcium 8.0 (L) 8.5 - 10.1 mg/dL    Bilirubin Total 0.5 0.2 - 1.3 mg/dL    Albumin 3.4 3.4 - 5.0 g/dL    Protein Total 6.4 (L) 6.8 - 8.8 g/dL    Alkaline Phosphatase 59 40 - 150 U/L    ALT 38 0 - 70 U/L    AST 14 0 - 45 U/L   Valproic acid   Result Value Ref Range    Valproic Acid Level 81 50 - 100 mg/L   EKG 12-lead, complete   Result Value Ref Range    Interpretation ECG Click View Image link to view waveform and result

## 2019-10-21 NOTE — PLAN OF CARE
Bertram discharged 1347 care of self via taxi to [facility]-denies SI-denies sxs psychosis and none evident-Bertram reviewed medication schedule and outpt tx plans and verbalized understanding-has 30 day supply of medications in his possession

## 2019-10-25 ENCOUNTER — HOSPITAL ENCOUNTER (EMERGENCY)
Facility: CLINIC | Age: 44
Discharge: PSYCHIATRIC OR SUBSTANCE USE FACILITY | End: 2019-10-25
Attending: EMERGENCY MEDICINE | Admitting: EMERGENCY MEDICINE
Payer: MEDICARE

## 2019-10-25 ENCOUNTER — HOSPITAL ENCOUNTER (INPATIENT)
Facility: CLINIC | Age: 44
LOS: 6 days | Discharge: HOME | DRG: 885 | End: 2019-10-31
Attending: PSYCHIATRY & NEUROLOGY | Admitting: PSYCHIATRY & NEUROLOGY
Payer: MEDICARE

## 2019-10-25 ENCOUNTER — APPOINTMENT (OUTPATIENT)
Dept: GENERAL RADIOLOGY | Facility: CLINIC | Age: 44
End: 2019-10-25
Attending: EMERGENCY MEDICINE
Payer: MEDICARE

## 2019-10-25 VITALS
TEMPERATURE: 98.5 F | HEART RATE: 73 BPM | WEIGHT: 211 LBS | DIASTOLIC BLOOD PRESSURE: 67 MMHG | HEIGHT: 72 IN | OXYGEN SATURATION: 97 % | BODY MASS INDEX: 28.58 KG/M2 | SYSTOLIC BLOOD PRESSURE: 116 MMHG | RESPIRATION RATE: 18 BRPM

## 2019-10-25 DIAGNOSIS — F25.0 SCHIZOAFFECTIVE DISORDER, BIPOLAR TYPE (H): Primary | ICD-10-CM

## 2019-10-25 DIAGNOSIS — F25.0 SCHIZOAFFECTIVE DISORDER, BIPOLAR TYPE (H): ICD-10-CM

## 2019-10-25 DIAGNOSIS — S50.01XA CONTUSION OF RIGHT ELBOW, INITIAL ENCOUNTER: ICD-10-CM

## 2019-10-25 LAB
AMPHETAMINES UR QL SCN: POSITIVE
BARBITURATES UR QL: NEGATIVE
BENZODIAZ UR QL: NEGATIVE
CANNABINOIDS UR QL SCN: NEGATIVE
COCAINE UR QL: NEGATIVE
OPIATES UR QL SCN: NEGATIVE
PCP UR QL SCN: NEGATIVE

## 2019-10-25 PROCEDURE — 90791 PSYCH DIAGNOSTIC EVALUATION: CPT

## 2019-10-25 PROCEDURE — 12400001 ZZH R&B MH UMMC

## 2019-10-25 PROCEDURE — 80307 DRUG TEST PRSMV CHEM ANLYZR: CPT | Performed by: EMERGENCY MEDICINE

## 2019-10-25 PROCEDURE — 25000132 ZZH RX MED GY IP 250 OP 250 PS 637: Mod: GY | Performed by: PSYCHIATRY & NEUROLOGY

## 2019-10-25 PROCEDURE — 73080 X-RAY EXAM OF ELBOW: CPT | Mod: RT

## 2019-10-25 PROCEDURE — 25000132 ZZH RX MED GY IP 250 OP 250 PS 637: Mod: GY | Performed by: EMERGENCY MEDICINE

## 2019-10-25 PROCEDURE — 99285 EMERGENCY DEPT VISIT HI MDM: CPT | Mod: 25

## 2019-10-25 RX ORDER — BENZTROPINE MESYLATE 1 MG/1
1 TABLET ORAL AT BEDTIME
Status: DISCONTINUED | OUTPATIENT
Start: 2019-10-25 | End: 2019-10-28

## 2019-10-25 RX ORDER — LORAZEPAM 1 MG/1
1 TABLET ORAL EVERY 8 HOURS PRN
Status: DISCONTINUED | OUTPATIENT
Start: 2019-10-25 | End: 2019-10-25 | Stop reason: HOSPADM

## 2019-10-25 RX ORDER — OLANZAPINE 10 MG/1
10 TABLET ORAL
Status: DISCONTINUED | OUTPATIENT
Start: 2019-10-25 | End: 2019-10-31 | Stop reason: HOSPADM

## 2019-10-25 RX ORDER — ACETAMINOPHEN 325 MG/1
650 TABLET ORAL EVERY 4 HOURS PRN
Status: DISCONTINUED | OUTPATIENT
Start: 2019-10-25 | End: 2019-10-31 | Stop reason: HOSPADM

## 2019-10-25 RX ORDER — OLANZAPINE 10 MG/1
10 TABLET, ORALLY DISINTEGRATING ORAL 2 TIMES DAILY PRN
Status: DISCONTINUED | OUTPATIENT
Start: 2019-10-25 | End: 2019-10-25 | Stop reason: HOSPADM

## 2019-10-25 RX ORDER — OLANZAPINE 10 MG/2ML
10 INJECTION, POWDER, FOR SOLUTION INTRAMUSCULAR
Status: DISCONTINUED | OUTPATIENT
Start: 2019-10-25 | End: 2019-10-31 | Stop reason: HOSPADM

## 2019-10-25 RX ORDER — IBUPROFEN 600 MG/1
600 TABLET, FILM COATED ORAL ONCE
Status: COMPLETED | OUTPATIENT
Start: 2019-10-25 | End: 2019-10-25

## 2019-10-25 RX ORDER — IBUPROFEN 600 MG/1
600 TABLET, FILM COATED ORAL EVERY 6 HOURS PRN
Status: DISCONTINUED | OUTPATIENT
Start: 2019-10-25 | End: 2019-10-31 | Stop reason: HOSPADM

## 2019-10-25 RX ORDER — BISACODYL 10 MG
10 SUPPOSITORY, RECTAL RECTAL DAILY PRN
Status: DISCONTINUED | OUTPATIENT
Start: 2019-10-25 | End: 2019-10-31 | Stop reason: HOSPADM

## 2019-10-25 RX ORDER — DIVALPROEX SODIUM 500 MG/1
2000 TABLET, EXTENDED RELEASE ORAL AT BEDTIME
Status: DISCONTINUED | OUTPATIENT
Start: 2019-10-25 | End: 2019-10-31 | Stop reason: HOSPADM

## 2019-10-25 RX ORDER — POLYETHYLENE GLYCOL 3350 17 G
2-4 POWDER IN PACKET (EA) ORAL
Status: DISCONTINUED | OUTPATIENT
Start: 2019-10-25 | End: 2019-10-31 | Stop reason: HOSPADM

## 2019-10-25 RX ORDER — ACETAMINOPHEN 325 MG/1
650 TABLET ORAL EVERY 4 HOURS PRN
Status: DISCONTINUED | OUTPATIENT
Start: 2019-10-25 | End: 2019-10-25 | Stop reason: HOSPADM

## 2019-10-25 RX ORDER — ALUMINA, MAGNESIA, AND SIMETHICONE 2400; 2400; 240 MG/30ML; MG/30ML; MG/30ML
30 SUSPENSION ORAL EVERY 4 HOURS PRN
Status: DISCONTINUED | OUTPATIENT
Start: 2019-10-25 | End: 2019-10-31 | Stop reason: HOSPADM

## 2019-10-25 RX ORDER — OLANZAPINE 20 MG/1
20 TABLET ORAL AT BEDTIME
Status: DISCONTINUED | OUTPATIENT
Start: 2019-10-25 | End: 2019-10-31 | Stop reason: HOSPADM

## 2019-10-25 RX ORDER — TRAZODONE HYDROCHLORIDE 50 MG/1
50 TABLET, FILM COATED ORAL
Status: DISCONTINUED | OUTPATIENT
Start: 2019-10-25 | End: 2019-10-31 | Stop reason: HOSPADM

## 2019-10-25 RX ORDER — HYDROXYZINE HYDROCHLORIDE 25 MG/1
25 TABLET, FILM COATED ORAL EVERY 4 HOURS PRN
Status: DISCONTINUED | OUTPATIENT
Start: 2019-10-25 | End: 2019-10-31 | Stop reason: HOSPADM

## 2019-10-25 RX ADMIN — OLANZAPINE 20 MG: 20 TABLET, FILM COATED ORAL at 20:32

## 2019-10-25 RX ADMIN — IBUPROFEN 600 MG: 600 TABLET ORAL at 12:21

## 2019-10-25 RX ADMIN — NICOTINE POLACRILEX 2 MG: 2 LOZENGE ORAL at 20:32

## 2019-10-25 RX ADMIN — ACETAMINOPHEN 650 MG: 325 TABLET ORAL at 08:54

## 2019-10-25 RX ADMIN — DIVALPROEX SODIUM 2000 MG: 500 TABLET, EXTENDED RELEASE ORAL at 20:32

## 2019-10-25 ASSESSMENT — ACTIVITIES OF DAILY LIVING (ADL)
DRESS: SCRUBS (BEHAVIORAL HEALTH)
DRESS: 0-->INDEPENDENT
BATHING: 0-->INDEPENDENT
RETIRED_EATING: 0-->INDEPENDENT
COGNITION: 2 - DIFFICULTY WITH ORGANIZING THOUGHTS
RETIRED_COMMUNICATION: 0-->UNDERSTANDS/COMMUNICATES WITHOUT DIFFICULTY
TOILETING: 0-->INDEPENDENT
AMBULATION: 0-->INDEPENDENT
TRANSFERRING: 0-->INDEPENDENT
FALL_HISTORY_WITHIN_LAST_SIX_MONTHS: NO
SWALLOWING: 0-->SWALLOWS FOODS/LIQUIDS WITHOUT DIFFICULTY

## 2019-10-25 ASSESSMENT — ENCOUNTER SYMPTOMS: SLEEP DISTURBANCE: 1

## 2019-10-25 ASSESSMENT — MIFFLIN-ST. JEOR
SCORE: 1875.09
SCORE: 1891.42

## 2019-10-25 NOTE — ED PROVIDER NOTES
Patient signed out to me with some unusual psychotic behavior and thought processes.  Reyes lopez DEC saw the patient and feels that this patient needs to be admitted as he is not thinking clearly and has psychotic features.  The patient agrees with the admission but because he is not of sound mind, he will be placed on a 72-hour hold.  He has been accepted under the care of Dr. Rose on station 30 at [facility].  He does have a contused elbow so a sling was placed for patient's comfort.  X-ray here was negative.      ICD-10-CM    1. Contusion of right elbow, initial encounter S50.01XA Drug abuse screen urine   2. Schizoaffective disorder, bipolar type (H) F25.0           Melissa Stanley MD  10/25/19 1109

## 2019-10-25 NOTE — ED NOTES
Pt to scrubs, belongings done and Video Observation initiated, patient informed.     Irvin Mccloud RN

## 2019-10-25 NOTE — ED NOTES
Patient walking in [facility] area, states he is having some pain in his elbow.  Tylenol given and pt set up with hot lunch.  Remains on CAMMIE with staff and security watch.  Denies any further needs at this time

## 2019-10-25 NOTE — ED NOTES
Bed: ED17  Expected date:   Expected time:   Means of arrival:   Comments:  536 44m elbow pain, psych eval. Eta 7 min
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
## 2019-10-26 PROCEDURE — 99207 ZZC CDG-MDM COMPONENT: MEETS MODERATE - DOWN CODED: CPT | Performed by: PSYCHIATRY & NEUROLOGY

## 2019-10-26 PROCEDURE — 25000132 ZZH RX MED GY IP 250 OP 250 PS 637: Mod: GY | Performed by: PSYCHIATRY & NEUROLOGY

## 2019-10-26 PROCEDURE — 99222 1ST HOSP IP/OBS MODERATE 55: CPT | Mod: AI | Performed by: PSYCHIATRY & NEUROLOGY

## 2019-10-26 PROCEDURE — 12400001 ZZH R&B MH UMMC

## 2019-10-26 RX ADMIN — DIVALPROEX SODIUM 2000 MG: 500 TABLET, EXTENDED RELEASE ORAL at 20:25

## 2019-10-26 RX ADMIN — IBUPROFEN 600 MG: 600 TABLET ORAL at 18:16

## 2019-10-26 RX ADMIN — OLANZAPINE 20 MG: 20 TABLET, FILM COATED ORAL at 20:26

## 2019-10-26 ASSESSMENT — ACTIVITIES OF DAILY LIVING (ADL)
LAUNDRY: WITH SUPERVISION
HYGIENE/GROOMING: INDEPENDENT
ORAL_HYGIENE: INDEPENDENT
DRESS: SCRUBS (BEHAVIORAL HEALTH);INDEPENDENT
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
## 2019-10-26 NOTE — PLAN OF CARE
Patient has only been out of bed for breakfast and lunch. Patient slept most of the day. Patient tolerated very brief interactions with staff. Had been observed walking on the heel of his Rt. Foot because his foot was sore. The ball of his foot looked slightly red,slightly swollen. Talked with his eyes closed. Irritable mood. Maintained reality based verbalizations.
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
## 2019-10-27 PROCEDURE — 12400001 ZZH R&B MH UMMC

## 2019-10-27 PROCEDURE — 25000132 ZZH RX MED GY IP 250 OP 250 PS 637: Mod: GY | Performed by: PSYCHIATRY & NEUROLOGY

## 2019-10-27 RX ADMIN — DIVALPROEX SODIUM 2000 MG: 500 TABLET, EXTENDED RELEASE ORAL at 20:02

## 2019-10-27 RX ADMIN — IBUPROFEN 600 MG: 600 TABLET ORAL at 19:58

## 2019-10-27 RX ADMIN — OLANZAPINE 20 MG: 20 TABLET, FILM COATED ORAL at 20:02

## 2019-10-27 RX ADMIN — NICOTINE POLACRILEX 4 MG: 2 LOZENGE ORAL at 21:03

## 2019-10-27 RX ADMIN — NICOTINE POLACRILEX 4 MG: 2 LOZENGE ORAL at 19:52

## 2019-10-27 ASSESSMENT — ACTIVITIES OF DAILY LIVING (ADL)
HYGIENE/GROOMING: PROMPTS
DRESS: INDEPENDENT
HYGIENE/GROOMING: INDEPENDENT
LAUNDRY: UNABLE TO COMPLETE
DRESS: STREET CLOTHES;INDEPENDENT
ORAL_HYGIENE: PROMPTS
ORAL_HYGIENE: INDEPENDENT

## 2019-10-27 NOTE — PROGRESS NOTES
Pt was in the milieu only for lunch.  He spent the shift in bed.  He was asked 3 times to check in but declined to talk with staff.  No information could be gained.

## 2019-10-27 NOTE — PROGRESS NOTES
Patient refused vital      10/27/19 1500   Vital Signs   Temp   (Refused)   Pulse   (Refused)   BP   (Refused)
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
## 2019-10-27 NOTE — PROGRESS NOTES
"Bertram spent 90% of the shift sleeping in his room. Pt got up only for meals, pt ate thoroughly. Pt denies SI/SIB and said \"I'm doing fine\" when responding to his mental health. Pt complains of elbow pain and foot pain. Pt stated he just needs sleep, and was short during check-in; responding with a blunted affect.     10/26/19 2133   Behavioral Health   Hallucinations denies / not responding to hallucinations   Thinking poor concentration;distractable   Orientation person: oriented;place: oriented   Memory baseline memory   Insight poor   Judgement impaired   Eye Contact at examiner   Affect blunted, flat   Mood mood is calm   Physical Appearance/Attire disheveled;untidy   Hygiene neglected grooming - unclean body, hair, teeth   Suicidality other (see comments)  (Pt denies SI/SIB)   1. Wish to be Dead (Past Month) No   2. Non-Specific Active Suicidal Thoughts (Past Month) No   Self Injury other (see comment)  (Pt denies SI/SIB)   Elopement   (No observed statements/behaviors of concern)   Activity withdrawn;isolative   Speech coherent;clear   Medication Sensitivity no stated side effects   Psychomotor / Gait foot dragging;slow  (Pt complains of foot pain)   Psycho Education   Type of Intervention 1:1 intervention   Response participates, initiates socially appropriate   Hours 0.5   Treatment Detail   (Check in)   Activities of Daily Living   Hygiene/Grooming independent   Oral Hygiene independent   Dress scrubs (behavioral health);independent   Laundry with supervision   Room Organization independent     "

## 2019-10-28 PROCEDURE — 12400001 ZZH R&B MH UMMC

## 2019-10-28 PROCEDURE — 25000132 ZZH RX MED GY IP 250 OP 250 PS 637: Mod: GY | Performed by: PSYCHIATRY & NEUROLOGY

## 2019-10-28 PROCEDURE — 99232 SBSQ HOSP IP/OBS MODERATE 35: CPT | Performed by: PSYCHIATRY & NEUROLOGY

## 2019-10-28 PROCEDURE — 90853 GROUP PSYCHOTHERAPY: CPT

## 2019-10-28 RX ADMIN — NICOTINE POLACRILEX 4 MG: 2 LOZENGE ORAL at 16:57

## 2019-10-28 RX ADMIN — NICOTINE POLACRILEX 4 MG: 2 LOZENGE ORAL at 18:49

## 2019-10-28 RX ADMIN — NICOTINE POLACRILEX 4 MG: 2 LOZENGE ORAL at 11:20

## 2019-10-28 RX ADMIN — DIVALPROEX SODIUM 2000 MG: 500 TABLET, EXTENDED RELEASE ORAL at 20:39

## 2019-10-28 RX ADMIN — IBUPROFEN 600 MG: 600 TABLET ORAL at 15:06

## 2019-10-28 RX ADMIN — NICOTINE POLACRILEX 4 MG: 2 LOZENGE ORAL at 20:39

## 2019-10-28 RX ADMIN — NICOTINE POLACRILEX 4 MG: 2 LOZENGE ORAL at 15:06

## 2019-10-28 RX ADMIN — OLANZAPINE 20 MG: 20 TABLET, FILM COATED ORAL at 20:39

## 2019-10-28 ASSESSMENT — ACTIVITIES OF DAILY LIVING (ADL)
HYGIENE/GROOMING: INDEPENDENT
DRESS: INDEPENDENT
ORAL_HYGIENE: INDEPENDENT
LAUNDRY: WITH SUPERVISION

## 2019-10-28 NOTE — PROGRESS NOTES
"  Patient isolated to his room and bed most of the shift, coming out primarily just for his lunch and a brief time in the milieu. No peer interaction or program engagement noted. Generally presenting as quiet and withdrawn but pleasant and cooperative with unit and personal care protocols.  Open and responsive to writer's MH status inquiries today.  Reported mood as \"better,\" rating both his depression and anxiety in the moderate to low range.  Denied presence of SI/SIB/HI and AH/VH's.  Stated that his concentration was adequate but rated his appetite and energy levels as \"poor.\"  Has concerns about his medications.  Has presented no behavioral management issues today.   Elvis Samuels   10/28/2019  "
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
## 2019-10-28 NOTE — PLAN OF CARE
BEHAVIORAL TEAM DISCUSSION    Participants: Han Munguia MD, Katie Greer RN and Rachael RIVERA  Progress: New admissions  Anticipated length of stay: 7 days  Continued Stay Criteria/Rationale: New admission - will evaluate and assess  Medical/Physical: will evaluate and assess  Precautions:   Behavioral Orders   Procedures    Assault precautions    Code 1 - Restrict to Unit    Routine Programming     As clinically indicated    Single Room    Status 15     Every 15 minutes.     Plan: Chemical use assessment. Further evaluation and assessment  Rationale for change in precautions or plan: N/A new admission

## 2019-10-28 NOTE — PLAN OF CARE
Pt was isolative to his room this evening, he came out for dinner and snacks but otherwise stayed in his room.   He did not have any outbursts surrounding medications this evening. Observed talking to himself this evening just prior to snacks. Endorsed AH. Denies SI, depression, anxiety.   Continues to state he has not slept and does not feel rested.   Length of stay: 2     Assessment of mood, thought process, response to medications and potential side effects continues.     Patient encouraged to participate in therapeutic groups and develop self-care skills.     Appropriate precautions maintained.

## 2019-10-28 NOTE — PROGRESS NOTES
SPIRITUAL HEALTH SERVICES  SPIRITUAL ASSESSMENT Progress Note  [facility] Station [facility]     REFERRAL SOURCE: [facility] referral    Consulted with unit staff regarding pt's appropriateness for a spiritual health visit. Staff member checked with pt who does not remember asking for a  and declined a visit at this time.     PLAN: Will notify unit . [facility] remains available for any further needs or requests.     CELESTINO Breaux.  Associate    Pager [phone]    * [facility] remains available 24/7 for emergent requests/referrals, either by having the switchboard page the on-call  or by entering an ASAP/STAT consult in Epic (this will also page the on-call ).*

## 2019-10-28 NOTE — PROGRESS NOTES
"[facility], [place]   Psychiatric Progress Note    Length of stay (days): 3        Interim History:   The patient's care was discussed with the treatment team during the daily team meeting and/or staff's chart notes were reviewed.  Staff report: no acute issues over night.  He has been irritable during medication administration time.  Prefers to have olanzapine referred to as Zyprexa and not doing so may prompt noncompliance.    Patient reports that his mood is improving, less depressed, less anxious.  Tolerating his medications without side effects.  He adds that he suspects staff may not be giving him Zyprexa.  \"Zyprexa does not make a 20 mg tab and that is what they keep trying to give me.\"  He describes taking two 10 mg tablets at home.  I educated him on the fact that there is a 20 mg Zyprexa tablet and encouraged his cooperation with that.  For the moment, he seemed content hearing this and was willing to cooperate.    Patient denies SI/HI.  Auditory hallucinations are present however diminishing.  Sleeping well.  Energy is low.  Concentration is limited yet improving.    He remains motivated to pursue residential chemical addiction treatment.         Medications:       benztropine  1 mg Oral At Bedtime     divalproex sodium extended-release  2,000 mg Oral At Bedtime     OLANZapine  20 mg Oral At Bedtime          Allergies:     Allergies   Allergen Reactions     Amoxicillin Rash     Penicillins Rash          Labs:   No results found for this or any previous visit (from the past 24 hour(s)).       Psychiatric Examination:     /78   Pulse 84   Temp 97.9  F (36.6  C) (Oral)   Resp 16   Ht 1.829 m (6')   Wt 97.3 kg (214 lb 9.6 oz)   BMI 29.10 kg/m    Weight is 214 lbs 9.6 oz  Body mass index is 29.1 kg/m .  Orthostatic Vitals     None            Appearance: awake, alert, unkempt and disheveled   Attitude:  cooperative  Eye Contact:  fair  Mood:  better  Affect:  mood " congruent  Speech:  decreased prosody  Psychomotor Behavior:  no evidence of tardive dyskinesia, dystonia, or tics  Throught Process:  linear  Associations:  no loose associations  Thought Content:  no evidence of suicidal ideation or homicidal ideation and auditory hallucinations present  Insight:  partial  Judgement:  fair  Oriented to:  time, person, and place  Attention Span and Concentration:  fair  Recent and Remote Memory:  fair    Clinical Global Impressions  First:  Considering your total clinical experience with this particular patient population, how severe are the patient's symptoms at this time?: 7 (10/26/19 1416)  Compared to the patient's condition at the START of treatment, this patient's condition is:: 4 (10/26/19 1416)  Most recent:  Considering your total clinical experience with this particular patient population, how severe are the patient's symptoms at this time?: 7 (10/26/19 1416)  Compared to the patient's condition at the START of treatment, this patient's condition is:: 4 (10/26/19 1416)         Precautions:     Behavioral Orders   Procedures     Assault precautions     Code 1 - Restrict to Unit     Routine Programming     As clinically indicated     Single Room     Status 15     Every 15 minutes.          DIagnoses:     Schizoaffective disorder-bipolar type  Stimulant use disorder, amphetamine type substance, moderate  Consider a substance-induced mood disorder (related to amphetamine use)  History of PTSD         Plan:     Continue Depakote and Zyprexa for mood stabilization and reduction of psychosis.  Monitoring response and tolerability as regular compliance is regained.  Discontinue Cogentin noting that he has been refusing that medication and is not exhibiting extrapyramidal side effects.    Psychosocial treatments to be addressed with social work consulting groups.  A chemical health assessment has been requested.    Legal status: On a 72-hour hold however we will change to  voluntary status today.    Disposition: Ideal disposition would be to transition him directly into a residential MI CD treatment program.  Exploring available options.

## 2019-10-28 NOTE — PROGRESS NOTES
Pt briefly wandered into OT group and was oriented to group materials, though has not formally attended scheduled occupational therapy sessions. Encourage attendance and participation. Pt will be given self-assessment form, and OT staff will explain the purpose of including them in their treatment plan and offer options for meeting their needs.

## 2019-10-28 NOTE — PROGRESS NOTES
Case Management Note    Writer met with patient to complete Rule 25 assessment.  Patient collaborated in the assessment.  He was assessed at the residential level of care.  He signed an MILA for [facility] and [facility] funding documents for [facility].  MILA and assessment were sent to [facility] and [facility] funding documents and assessment were sent to [facility].  CTC to follow up.  Contact info is below.  Documentation was placed in patient's physical chart.      [facility] Clinical Review Team   [phone]/ Fax: [phone]    [facility] Residential Admissions  [phone] / Fax [phone]    Patient did not complete Personal plan of care.  Asked to be able to complete tomorrow when less tired

## 2019-10-29 PROCEDURE — 12400001 ZZH R&B MH UMMC

## 2019-10-29 PROCEDURE — 99232 SBSQ HOSP IP/OBS MODERATE 35: CPT | Performed by: PSYCHIATRY & NEUROLOGY

## 2019-10-29 PROCEDURE — 25000132 ZZH RX MED GY IP 250 OP 250 PS 637: Mod: GY | Performed by: PSYCHIATRY & NEUROLOGY

## 2019-10-29 PROCEDURE — H2032 ACTIVITY THERAPY, PER 15 MIN: HCPCS

## 2019-10-29 RX ADMIN — NICOTINE POLACRILEX 4 MG: 2 LOZENGE ORAL at 18:14

## 2019-10-29 RX ADMIN — NICOTINE POLACRILEX 4 MG: 2 LOZENGE ORAL at 16:21

## 2019-10-29 RX ADMIN — NICOTINE POLACRILEX 4 MG: 2 LOZENGE ORAL at 19:46

## 2019-10-29 RX ADMIN — OLANZAPINE 20 MG: 20 TABLET, FILM COATED ORAL at 19:46

## 2019-10-29 RX ADMIN — DIVALPROEX SODIUM 2000 MG: 500 TABLET, EXTENDED RELEASE ORAL at 19:46

## 2019-10-29 ASSESSMENT — ACTIVITIES OF DAILY LIVING (ADL)
HYGIENE/GROOMING: INDEPENDENT
ORAL_HYGIENE: INDEPENDENT
DRESS: INDEPENDENT

## 2019-10-29 NOTE — PLAN OF CARE
Patient spent the majority of the shift out of his room. He was actively hallucinating but stated the AH were not disturbing to him. He denies SI/SIB/HI.   Patient states plan is to stay here until he can get into tx and stable housing. He is happy with this plan.   Patient took his medication without becoming angry this evening.   Patient said he was enjoying the headphones and spent some time singing while in the dining room. Reported being in a good mood.   Temp: 97.6  F (36.4  C) Temp src: Oral BP: 129/85 Pulse: 81             Length of stay: 3     Assessment of mood, thought process, response to medications and potential side effects continues.     Patient encouraged to participate in therapeutic groups and develop self-care skills.     Appropriate precautions maintained.

## 2019-10-29 NOTE — PROGRESS NOTES
Writer received a voicemail from Fabiola from [facility] stating that patient does not have open services in [facility]. Patient's Rule 25 was sent to [facility] as patient is open in that [facility] with a  in place Linda Priceong- [phone].    Rule 25 [facility]:    (P): [phone]    (F): [phone]

## 2019-10-29 NOTE — PROGRESS NOTES
"   10/28/19 1700   Group Therapy Session   Group Attendance attended group session   Total Time (minutes) 45   Group Type psychotherapeutic   Group Topic Covered other (see comments)   Patient Participation/Contribution cooperative with task;discussed personal experience with topic;listened actively   Psychotherapy goal: Self-reflection through the use of the \"[facility]\" activity.  Bertram presented with flat mood/affect. 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 He reported not wanting to share that he has used marijuana, but \"at least I didn't use the hard stuff, like meth.\" He seemed to have the need to justify his marijuana use. This writer emphasized that the group does not  and thanked him for being open.  "

## 2019-10-29 NOTE — PROGRESS NOTES
"[facility], [place]   Psychiatric Progress Note    Length of stay (days): 4        Interim History:   The patient's care was discussed with the treatment team during the daily team meeting and/or staff's chart notes were reviewed.  Staff report: no acute issues over night.  Taking his medications.  Mostly isolating to his room.  He appears to be experiencing overt hallucinations.    Patient reports that his mood is improving, less depressed, less anxious.  \"My medications seem to be working.\"    Patient denies SI/HI.  Auditory hallucinations are present however diminishing.  Sleeping well.  Energy is low.  Concentration is limited yet improving.    He remains motivated to pursue residential chemical addiction treatment.         Medications:       divalproex sodium extended-release  2,000 mg Oral At Bedtime     OLANZapine  20 mg Oral At Bedtime          Allergies:     Allergies   Allergen Reactions     Amoxicillin Rash     Penicillins Rash          Labs:   No results found for this or any previous visit (from the past 24 hour(s)).       Psychiatric Examination:     /85   Pulse 81   Temp 97.6  F (36.4  C) (Oral)   Resp 16   Ht 1.829 m (6')   Wt 97.3 kg (214 lb 9.6 oz)   BMI 29.10 kg/m    Weight is 214 lbs 9.6 oz  Body mass index is 29.1 kg/m .  Orthostatic Vitals     None            Appearance: awake, alert, unkempt and disheveled   Attitude:  cooperative  Eye Contact:  fair  Mood:  better  Affect:  mood congruent  Speech:  decreased prosody  Psychomotor Behavior:  no evidence of tardive dyskinesia, dystonia, or tics  Throught Process:  linear  Associations:  no loose associations  Thought Content:  no evidence of suicidal ideation or homicidal ideation and auditory hallucinations present  Insight:  partial  Judgement:  fair  Oriented to:  time, person, and place  Attention Span and Concentration:  fair  Recent and Remote Memory:  fair    Clinical Global " Impressions  First:  Considering your total clinical experience with this particular patient population, how severe are the patient's symptoms at this time?: 7 (10/26/19 1416)  Compared to the patient's condition at the START of treatment, this patient's condition is:: 4 (10/26/19 1416)  Most recent:  Considering your total clinical experience with this particular patient population, how severe are the patient's symptoms at this time?: 7 (10/26/19 1416)  Compared to the patient's condition at the START of treatment, this patient's condition is:: 4 (10/26/19 1416)         Precautions:     Behavioral Orders   Procedures     Assault precautions     Code 1 - Restrict to Unit     Routine Programming     As clinically indicated     Single Room     Status 15     Every 15 minutes.          DIagnoses:     Schizoaffective disorder-bipolar type  Stimulant use disorder, amphetamine type substance, moderate  Consider a substance-induced mood disorder (related to amphetamine use)  History of PTSD         Plan:     Continue Depakote and Zyprexa for mood stabilization and reduction of psychosis.  Monitoring response and tolerability as regular compliance is regained.    Psychosocial treatments to be addressed with social work consulting groups.  A chemical health assessment was completed on October 28.    Legal status: Voluntary status    Disposition: Ideal disposition would be to transition him directly into a residential MI/CD treatment program.  Exploring available options.

## 2019-10-30 PROCEDURE — 99232 SBSQ HOSP IP/OBS MODERATE 35: CPT | Performed by: PSYCHIATRY & NEUROLOGY

## 2019-10-30 PROCEDURE — 25000132 ZZH RX MED GY IP 250 OP 250 PS 637: Mod: GY | Performed by: PSYCHIATRY & NEUROLOGY

## 2019-10-30 PROCEDURE — 12400001 ZZH R&B MH UMMC

## 2019-10-30 RX ADMIN — OLANZAPINE 20 MG: 20 TABLET, FILM COATED ORAL at 21:00

## 2019-10-30 RX ADMIN — NICOTINE POLACRILEX 4 MG: 2 LOZENGE ORAL at 21:28

## 2019-10-30 RX ADMIN — DIVALPROEX SODIUM 2000 MG: 500 TABLET, EXTENDED RELEASE ORAL at 21:00

## 2019-10-30 RX ADMIN — NICOTINE POLACRILEX 4 MG: 2 LOZENGE ORAL at 16:20

## 2019-10-30 RX ADMIN — NICOTINE POLACRILEX 4 MG: 2 LOZENGE ORAL at 18:51

## 2019-10-30 RX ADMIN — IBUPROFEN 600 MG: 600 TABLET ORAL at 16:41

## 2019-10-30 RX ADMIN — NICOTINE POLACRILEX 4 MG: 2 LOZENGE ORAL at 11:53

## 2019-10-30 RX ADMIN — NICOTINE POLACRILEX 4 MG: 2 LOZENGE ORAL at 14:05

## 2019-10-30 ASSESSMENT — ACTIVITIES OF DAILY LIVING (ADL)
DRESS: INDEPENDENT
HYGIENE/GROOMING: INDEPENDENT
LAUNDRY: WITH SUPERVISION
LAUNDRY: WITH SUPERVISION
ORAL_HYGIENE: INDEPENDENT
DRESS: INDEPENDENT
ORAL_HYGIENE: INDEPENDENT
HYGIENE/GROOMING: INDEPENDENT

## 2019-10-30 NOTE — PROGRESS NOTES
Pt was in a good mood this evening. He spent his time walking in the hallway and resting in bed. He was calm and cooperative, though he didn't interact much with staff or peers. He said his arm and foot have been in pain for a few days but that it feels better today. His goal for the evening was to take a shower, which he wasn't able to accomplish. He attended the recreational therapy group. He talked to himself a majority of the evening. He denies any anxiety, depression, SI, SIB, or hallucinations. He plans to discharge once a spot becomes available at a sober house. He also plans to go to a treatment facility when he leaves.     10/29/19 2100   Behavioral Health   Hallucinations denies / not responding to hallucinations   Thinking intact   Orientation person: oriented;place: oriented   Memory baseline memory   Insight poor   Judgement impaired   Eye Contact at examiner   Affect blunted, flat   Mood mood is calm   Physical Appearance/Attire appears stated age;attire appropriate to age and situation   Hygiene neglected grooming - unclean body, hair, teeth   Suicidality other (see comments)  (denies)   1. Wish to be Dead (Recent) No   2. Non-Specific Active Suicidal Thoughts (Recent) No   Self Injury other (see comment)  (denies)   Elopement   (none)   Activity withdrawn   Speech clear;coherent   Medication Sensitivity no stated side effects;no observed side effects   Psychomotor / Gait balanced;steady   Activities of Daily Living   Hygiene/Grooming independent   Oral Hygiene independent   Dress independent   Room Organization independent

## 2019-10-30 NOTE — PROGRESS NOTES
patient spent most of the shift isolating in his room. While he denies all issues, looking to discharge soon, he was seen muttering under his breath to himself for much of the shift as well.        10/30/19 9166   Behavioral Health   Hallucinations appears responding   Thinking intact;poor concentration   Orientation person: oriented;place: oriented;date: oriented;time: oriented   Memory baseline memory   Insight poor   Judgement impaired   Eye Contact at examiner   Affect blunted, flat   Mood mood is calm   Physical Appearance/Attire attire appropriate to age and situation;appears stated age   Hygiene well groomed   1. Wish to be Dead (Recent) No   2. Non-Specific Active Suicidal Thoughts (Recent) No   Activity withdrawn;isolative   Speech clear;coherent   Medication Sensitivity no stated side effects;no observed side effects   Psychomotor / Gait balanced;steady   Psycho Education   Type of Intervention 1:1 intervention   Response participates, initiates socially appropriate   Hours 0.5   Activities of Daily Living   Hygiene/Grooming independent   Oral Hygiene independent   Dress independent   Laundry with supervision   Room Organization independent

## 2019-10-30 NOTE — PROGRESS NOTES
"  I asked HUC to print the medicare paperwork.    I received a call from Alfonso Munroe at [facility] who says that he is authorizing the complex program at [facility] since the pt has had many treatment attempts like [facility] and he will probably not be self sufficient.    I approached pt at 11:30AM and he was laying in bed with the covers over his head. I introduced myself. I updated him as above. He asked when he would go. I aksed in return how he was feeling and he said \"fine.\" I stated I would make some calls and talk with his . He was in agreement with the plan.      I spoke with Fabiola at:  [facility]  [address]   [address]  MI-CD with Lodging  [facility] [facility]  CD assessments done at [address] [facility]    Fax:[phone]  [place], [place]  Phone: [phone]  The Health Unit Coordinator has faxed these discharge instructions to Fax: [phone]       She is processing the request from [facility] now. She will call me back. If they accept him they could admit him at the beginning of the week.  Fabiola has no called back and said that they can admit him on November 7, Thursday, at 9:30AM. Pt needs to go to [address], [place] [address]. There is no psychiatry there so I will set up an appointment.      I attempted to call the . The number went to a general line for [facility] and I had to leave a .    A review of the public web site shows 4 prior civil commitments. They all appeared to be closed and seems to be that pt is no longer under a commitment.    Pt tells me that he has psychiatry at [facility] and I called and confirmed and added this to the AVS.  Attend your psychiatric medication management appointment with Kenyetta Degroot PA-C on Tuesday, December 10, 2019 at 2PM.  [facility]   [facility],   [place] [address]  [phone]  The Health Unit Coordinator has faxed " these discharge instructions to Fax: [phone]    I suggested to pt that he might want to consider a crisis home for interim treatment.

## 2019-10-30 NOTE — PROGRESS NOTES
10/29/19 [facility]   Therapeutic Recreation   Type of Intervention structured groups   Activity game   Response Participates, initiates socially appropriate   Hours 1     Pt participated in Therapeutic Recreation group with focus on leisure participation, social engagement, and critical thinking. Engaged and focused in the group recreational intervention via a group game.  Pt was a full participant throughout the entire duration of group. Showed progress in session goals. Pt mood was calm and appropriate throughout group.

## 2019-10-30 NOTE — PLAN OF CARE
Patient has been out of his room for much of the evening. He participated in community meeting and had dinner in the dining room. He has spent some time pacing and listening to head sets. He states his elbow and heal pain are improving. Took ibuprofen with some additional relief.   Patient denies SI/HI. States AH are not disruptive or disturbing to him. No agitation this evening.  No side effects from medication noted.   Patient states sleep and appetite are good. He denies concerns.   Temp: 97.8  F (36.6  C) Temp src: Oral BP: 123/76 Pulse: 91   Resp: 16         Length of stay: 5     Assessment of mood, thought process, response to medications and potential side effects continues.     Patient encouraged to participate in therapeutic groups and develop self-care skills.     Appropriate precautions maintained.

## 2019-10-31 VITALS
HEIGHT: 72 IN | RESPIRATION RATE: 16 BRPM | BODY MASS INDEX: 30.28 KG/M2 | TEMPERATURE: 97.3 F | SYSTOLIC BLOOD PRESSURE: 123 MMHG | HEART RATE: 91 BPM | OXYGEN SATURATION: 95 % | DIASTOLIC BLOOD PRESSURE: 76 MMHG | WEIGHT: 223.6 LBS

## 2019-10-31 PROCEDURE — 99239 HOSP IP/OBS DSCHRG MGMT >30: CPT | Performed by: PSYCHIATRY & NEUROLOGY

## 2019-10-31 PROCEDURE — 25000132 ZZH RX MED GY IP 250 OP 250 PS 637: Mod: GY | Performed by: PSYCHIATRY & NEUROLOGY

## 2019-10-31 RX ORDER — OLANZAPINE 20 MG/1
20 TABLET ORAL AT BEDTIME
Qty: 30 TABLET | Refills: 0 | Status: SHIPPED | OUTPATIENT
Start: 2019-10-31

## 2019-10-31 RX ORDER — DIVALPROEX SODIUM 500 MG/1
2000 TABLET, EXTENDED RELEASE ORAL AT BEDTIME
Qty: 120 TABLET | Refills: 0 | Status: SHIPPED | OUTPATIENT
Start: 2019-10-31

## 2019-10-31 RX ADMIN — NICOTINE POLACRILEX 4 MG: 2 LOZENGE ORAL at 12:36

## 2019-10-31 RX ADMIN — NICOTINE POLACRILEX 4 MG: 2 LOZENGE ORAL at 14:30

## 2019-10-31 RX ADMIN — NICOTINE POLACRILEX 4 MG: 2 LOZENGE ORAL at 11:04

## 2019-10-31 ASSESSMENT — ACTIVITIES OF DAILY LIVING (ADL)
HYGIENE/GROOMING: INDEPENDENT;PROMPTS
ORAL_HYGIENE: INDEPENDENT
DRESS: INDEPENDENT
HYGIENE/GROOMING: HANDWASHING;SHOWER;INDEPENDENT
LAUNDRY: UNABLE TO COMPLETE
DRESS: STREET CLOTHES;INDEPENDENT

## 2019-10-31 ASSESSMENT — MIFFLIN-ST. JEOR: SCORE: 1932.24

## 2019-10-31 NOTE — PROGRESS NOTES
"  Patient has remained in his room and bed much of the AM, getting up late AM to do some intermittent pacing.   Continues to present as quiet, and withdrawn, but pleasant and cooperative with unit protocols and expectations.    Peer interaction has been minimal as has his program engagement today. Responsive but marginally informative to writer's check-in inquiries.  Patient denied depression, anxiety, SI/SIB/HI, AH/VH's, and issues with concentration.  Stated that his sleep pattern was adequate but that his energy level and appetite were generally problematic for him.  Hygiene remains poor. Has been observed at times responding to internal stimuli (talking to himself). Has presented no significant behavioral management issues today.  Currently waiting for MICD placement.   Elvis Samuels   10/31/2019     10/31/19 1056   Sleep/Rest/Relaxation   Day/Evening Time Hours napping;resting in bed   Behavioral Health   Hallucinations denies / not responding to hallucinations   Thinking other (see comment)  (patient claims \"okay\")   Orientation person: oriented;place: oriented   Insight poor;other (see comment)  (limited)   Judgement impaired   Eye Contact at examiner   Affect blunted, flat   Mood mood is calm   Physical Appearance/Attire untidy;disheveled   Hygiene neglected grooming - unclean body, hair, teeth   Suicidality other (see comments)  (denied SI)   2. Non-Specific Active Suicidal Thoughts (Recent) No   Self Injury other (see comment)  (denied SIB urges)   Elopement   (no indicators)   Activity isolative;withdrawn   Speech clear;coherent   Psychomotor / Gait balanced;steady   Overt Aggression Scale   Verbal Aggression 0   Aggression against Property 0   Auto-Aggression 0   Physical Aggression 0   Overt Aggression Total Score 0   Coping/Psychosocial   Verbalized Emotional State acceptance;relief   Psycho Education   Type of Intervention 1:1 intervention   Response participates with encouragement   Hours 0.5 "   Treatment Detail current MH status   Safety   Suicidality Status 15   Violence Risk   Feels Like Hurting Others no   Activities of Daily Living   Hygiene/Grooming independent;prompts   Dress independent   Room Organization independent   Groups   Details no participation

## 2019-10-31 NOTE — PLAN OF CARE
Pt discharged @  1510 with 30 day supply of medications and all belongings to crisis residence to await opening at [facility] program.  Pt has full range of affect pt denies any suicidal thoughts of any kind. Pt observed to be talking to self while pacing the hallway. Pt reports voices to less intrusive. Pt able to attend and fucus completely in conversation, group, and and during discharge instructions. Thinking appears linear and organized. Pt voices feeling hopeful about treatment program.

## 2019-10-31 NOTE — PROGRESS NOTES
"[facility], [place]   Psychiatric Progress Note    Length of stay (days): 6        Interim History:   The patient's care was discussed with the treatment team during the daily team meeting and/or staff's chart notes were reviewed.  Staff report: no acute issues over night.  Taking his medications.  Mostly isolating to his room.  He appears to be experiencing overt hallucinations at times.    Patient reports that his mood is improving, less depressed, less anxious.  \"My medications seem to be working.\"    Patient denies SI/HI.  Auditory hallucinations are present however diminishing.  Sleeping well.  Energy is low.  Concentration is limited yet improving.    He remains motivated to pursue residential chemical addiction treatment.         Medications:       divalproex sodium extended-release  2,000 mg Oral At Bedtime     OLANZapine  20 mg Oral At Bedtime          Allergies:     Allergies   Allergen Reactions     Amoxicillin Rash     Penicillins Rash          Labs:   No results found for this or any previous visit (from the past 24 hour(s)).       Psychiatric Examination:     /76   Pulse 91   Temp 97.3  F (36.3  C)   Resp 16   Ht 1.829 m (6')   Wt 101.4 kg (223 lb 9.6 oz)   SpO2 95%   BMI 30.33 kg/m    Weight is 223 lbs 9.6 oz  Body mass index is 30.33 kg/m .  Orthostatic Vitals     None        Appearance: awake, alert, unkempt and disheveled   Attitude:  cooperative  Eye Contact:  fair  Mood:  better  Affect:  mood congruent  Speech:  decreased prosody  Psychomotor Behavior:  no evidence of tardive dyskinesia, dystonia, or tics  Throught Process:  linear  Associations:  no loose associations  Thought Content:  no evidence of suicidal ideation or homicidal ideation and auditory hallucinations present  Insight:  partial  Judgement:  fair  Oriented to:  time, person, and place  Attention Span and Concentration:  fair  Recent and Remote Memory:  fair    Clinical Global " Impressions  First:  Considering your total clinical experience with this particular patient population, how severe are the patient's symptoms at this time?: 7 (10/26/19 1416)  Compared to the patient's condition at the START of treatment, this patient's condition is:: 4 (10/26/19 1416)  Most recent:  Considering your total clinical experience with this particular patient population, how severe are the patient's symptoms at this time?: 7 (10/26/19 1416)  Compared to the patient's condition at the START of treatment, this patient's condition is:: 4 (10/26/19 1416)         Precautions:     Behavioral Orders   Procedures     Assault precautions     Code 1 - Restrict to Unit     Routine Programming     As clinically indicated     Single Room     Status 15     Every 15 minutes.          DIagnoses:     Schizoaffective disorder-bipolar type  Stimulant use disorder, amphetamine type substance, moderate  Consider a substance-induced mood disorder (related to amphetamine use)  History of PTSD         Plan:     Continue Depakote and Zyprexa for mood stabilization and reduction of psychosis. Prior effective dosing has been resumed. Monitoring response and tolerability as regular compliance is regained.      Psychosocial treatments to be addressed with social work consulting groups.  A chemical health assessment was completed on October 28.    Legal status: Voluntary status    Disposition: Accepted for residential treatment on Thursday of next week.  We will attempt to transition his care to a crisis facility until then.

## 2019-10-31 NOTE — DISCHARGE SUMMARY
[facility]  Department of Psychiatry    DATE OF ADMISSION:  10/25/2019    DATE OF DISCHARGE:  October 31, 2019    DISCHARGE DIAGNOSES:   Schizoaffective disorder-bipolar type  Stimulant use disorder, amphetamine type substance, moderate  Consider a substance-induced mood disorder (related to amphetamine use)  History of PTSD    HOSPITAL COURSE: (Refer to H&P, progress notes, and consult notes for details)    The patient was admitted to our Behavioral Health Unit under a 72-hour hold for manic-like symptoms and thought disorganization in the context of medication noncompliance.  He was agreeable to sign in voluntarily.  His outpatient medications were resumed which included a combination of Depakote and Zyprexa targeting mood stabilization and reduction of psychosis.  He was agreeable to complete a chemical health assessment to explore residential treatment options.  His mood improved, thought disorganization significantly reduced, psychotic symptoms were minimal, and he continued to deny suicidal and homicidal thoughts.  Noting his improvements, he no longer met criteria for inpatient psychiatric hospitalization.  As he was awaiting bed availability for residential MI CD treatment, his care was transitioned to a crisis facility.    Other interventions received during his hospitalization included:   Psychosocial treatments were addressed with groups, social work consult, and supportive milieu provided by staff.    CONDITION AT DISCHARGE:  Improved.  The patients acute suicide risk is low due to the following factors:  improved mood/anxiety symptoms.  Denies suicidal ideations. Denies psychotic symptoms.  Not actively intoxicated and plans to abstain from illicit substances and alcohol.  Denies access to guns.  Denies feeling hopeless or helpless. At the time of discharge Bertram Woods was determined to not be an immediate danger to herself or others. The patient's acute  risk will be higher if noncompliant with treatment plan, medications, follow-up or using illicit substances or alcohol.  These findings along with the risks of noncompliance with medications and treatment plan, which could potentially cause decompensation and increase the risk for suicide, were discussed with the patient.  The patients chronic suicide risk is moderate given the following factors: past suicide attempts; white race; diagnosis of Schizoaffective Disorder, unemployed; homeless; history of chemical dependency; Denied a family history of suicide.  Preventative factors include: social supports     MENTAL STATUS EXAMINATION AT TIME OF DISCHARGE:  The patient is 44 year old White male who appears their stated age and is appropriately dressed with good hygiene.  Calm and cooperative with the interview questions.  No psychomotor abnormalities are noted. Eye contact is appropriate. Speech has normal rate, tone, latency and volume and is not pushed or pressured. Mood is euthymic and affect is full and appropriate.  The patient does not seem overtly depressed, anxious, manic or irritable.  Thought process is linear, logical and future oriented.  Thought process is not tangential, circumstantial or disorganized.  Thought content is not significant for apperant paranoia, delusions, ideas of reference or grandiosity.  The patient denies suicidal and homicidal ideations as well as auditory and visual hallucinations.  Insight and judgment are fair.  Cognition appears intact to interviewing including orientation, recent and remote memory, fund of knowledge, use of language, attention span and concentration.  Muscle strength, tone and gait appear normal on visual inspection.      DISPOSITION:  The patient is discharged to a crisis residence as he awaits bed availability for residential MI CD treatment    FOLLOWUP APPOINTMENTS:  ( per social workers notes and after visit summary)  You have a [facility] .  Attempts to reach Linda Shaw [phone] through [facility] [place] were unsuccessful.   Attempts to contact the [facility] Phone: [phone] to help find your  was also unsuccessful.         You are being admitted to a crisis home today.  [facility]  Crisis Residence  [address] [place] [address] Phone: [phone]            You had a Rule 25 assessment on October 28, 2019 at [facility] with Rachael Spear.  This was sent to [place] [address] and Alfonso Munroe authorized [facility].  You are being admitted to [facility] on November 7, 2019 at 9:30AM for the complex program that goes for 4-6 months. There is a housing worker that will help you find housing.  [address] [facility]  [place], [place]  Phone: [phone]  The Health Unit Coordinator has faxed these discharge instructions to Fax: [phone]        Attend your psychiatric medication management appointment with Kenyetta Degroot PA-C on Tuesday, December 10, 2019 at 2PM.  [facility]   [address],   [place] [address]  [phone]      DISCHARGE MEDICATIONS:   Current Discharge Medication List      CONTINUE these medications which have CHANGED    Details   divalproex sodium extended-release (DEPAKOTE ER) 500 MG 24 hr tablet Take 4 tablets (2,000 mg) by mouth At Bedtime  Qty: 120 tablet, Refills: 0    Associated Diagnoses: Schizoaffective disorder, bipolar type (H)      OLANZapine (ZYPREXA) 20 MG tablet Take 1 tablet (20 mg) by mouth At Bedtime  Qty: 30 tablet, Refills: 0    Associated Diagnoses: Schizoaffective disorder, bipolar type (H)         STOP taking these medications       benztropine (COGENTIN) 1 MG tablet Comments:   Reason for Stopping:         nicotine (COMMIT) 2 MG lozenge Comments:   Reason for Stopping:                LABORATORY RESULTS: (past 14 days)  Recent  Results (from the past 336 hour(s))   CBC with platelets differential    Collection Time: 10/17/19  3:28 PM   Result Value Ref Range    WBC 5.5 4.0 - 11.0 10e9/L    RBC Count 4.92 4.4 - 5.9 10e12/L    Hemoglobin 16.2 13.3 - 17.7 g/dL    Hematocrit 47.4 40.0 - 53.0 %    MCV 96 78 - 100 fl    MCH 32.9 26.5 - 33.0 pg    MCHC 34.2 31.5 - 36.5 g/dL    RDW 13.3 10.0 - 15.0 %    Platelet Count 206 150 - 450 10e9/L    Diff Method Automated Method     % Neutrophils 45.3 %    % Lymphocytes 39.3 %    % Monocytes 12.0 %    % Eosinophils 2.7 %    % Basophils 0.5 %    % Immature Granulocytes 0.2 %    Nucleated RBCs 0 0 /100    Absolute Neutrophil 2.5 1.6 - 8.3 10e9/L    Absolute Lymphocytes 2.2 0.8 - 5.3 10e9/L    Absolute Monocytes 0.7 0.0 - 1.3 10e9/L    Absolute Eosinophils 0.2 0.0 - 0.7 10e9/L    Absolute Basophils 0.0 0.0 - 0.2 10e9/L    Abs Immature Granulocytes 0.0 0 - 0.4 10e9/L    Absolute Nucleated RBC 0.0    Comprehensive metabolic panel    Collection Time: 10/17/19  3:28 PM   Result Value Ref Range    Sodium 139 133 - 144 mmol/L    Potassium 4.4 3.4 - 5.3 mmol/L    Chloride 108 94 - 109 mmol/L    Carbon Dioxide 25 20 - 32 mmol/L    Anion Gap 6 3 - 14 mmol/L    Glucose 76 70 - 99 mg/dL    Urea Nitrogen 13 7 - 30 mg/dL    Creatinine 0.82 0.66 - 1.25 mg/dL    GFR Estimate >90 >60 mL/min/[1.73_m2]    GFR Estimate If Black >90 >60 mL/min/[1.73_m2]    Calcium 8.0 (L) 8.5 - 10.1 mg/dL    Bilirubin Total 0.5 0.2 - 1.3 mg/dL    Albumin 3.4 3.4 - 5.0 g/dL    Protein Total 6.4 (L) 6.8 - 8.8 g/dL    Alkaline Phosphatase 59 40 - 150 U/L    ALT 38 0 - 70 U/L    AST 14 0 - 45 U/L   EKG 12-lead, complete    Collection Time: 10/18/19  8:04 AM   Result Value Ref Range    Interpretation ECG Click View Image link to view waveform and result    Valproic acid    Collection Time: 10/18/19  9:02 AM   Result Value Ref Range    Valproic Acid Level 81 50 - 100 mg/L   Drug abuse screen urine    Collection Time: 10/25/19  8:53 AM   Result Value  Ref Range    Amphetamine Qual Urine Positive (A) NEG^Negative    Barbiturates Qual Urine Negative NEG^Negative    Benzodiazepine Qual Urine Negative NEG^Negative    Cannabinoids Qual Urine Negative NEG^Negative    Cocaine Qual Urine Negative NEG^Negative    Opiates Qualitative Urine Negative NEG^Negative    PCP Qual Urine Negative NEG^Negative       >30 minutes was spent on this discharge to allow for reviewing the patient's response to treatment, reviewing plan of care, education on medications and diagnosis, and conducting a risk assessment.

## 2019-10-31 NOTE — PROGRESS NOTES
I asked pt if he would like to go to a crisis home and he jumped out of bed and was enthusiastic about this.      MD ordered medications for discharge. The discharge pharmacy called and said that the meds are too early to fill. They were just filled on 10/20. I asked [facility] to get security envelope up which has medications in them. This came up and we see that he does have a lot of depakote. I called the discharge pharmacy and asked Rsoita to try to get an override on the zyprexa. Pt has [employer] for Part D which is medication coverage. [employer] declined to do an override. Rosita says that they could bill the pt $86.27 for enough tablets to get him to the November 13 refill date.  I met with pt and RN. Pt said he has a representative payee.  Santos.  [employer] Payee  Social  Payee  [address] [facility], [place], [address] [phone]  We tried calling Santos and got vm and then pt tried again and reached him. He said he would pay for these.  I gave the discharge pharmacy this address for them to bill.        I called the first facility and they did a phone interview with pt also. He did quite well. They stated they would have him come for a face to face interview but could not guarantee an admission so I couldn't move forward with this.    [facility]  - [facility]  [facility] Crisis Stabilization Program  [address] [place] [address]  [email or web address] Phone:[phone] Fax:[phone]       I called [employer] for Ritu Walsh and they said they have a male bed at the [facility]. I sent the records. They called back but I didn't have the medications worked out yet.    [facility]    [facility]  [address] [place] [address]   [phone]     Fax:[phone]  [place] fax:  [phone]               I called [facility] and they want the referral form sent over. I did this and they interviewed him on the  phone. I worked out the med situation and they accepted him. I arranged for [employer] cab.      [facility]  [address] [place] [address]   Phone: [phone] Fax: [phone]              Behavioral Discharge Planning and Instructions      Summary:  You were admitted on 10/25/2019  due to psychoses with auditory hallucinations and hostility. You had been off your medications. You  called 911 from the [facility].  You were treated by Dr. Han Munguia MD.  You were restarted on your medications. You had a Rule 25 assessment.  You were scheduled to enter the [facility]  on November 7, 2019. You were discharged on 10/31/2019 from Station 30 to Crisis Residence. You are using [employer] for transportation.      Principal Diagnosis:     Schizoaffective disorder-bipolar type  Stimulant use disorder, amphetamine type substance, moderate  Consider a substance-induced mood disorder (related to amphetamine use)  History of PTSD      Health Care Follow-up Appointments:   You have Medicare and signed the required paperwork.  You also have medical assistance.You have medical assistance for insurance coverage.You can use this service for transportation to your health care appointments if you do not have a way to get to your appointments. [employer] - - [phone]     You have a Social  Payee.  Charmaine  [employer] Payee  [address] [place], [place], [address] [phone]      You have a [facility] . Attempts to reach Linda Ribeiro [phone] through [employer] [place] were unsuccessful.   Attempts to contact the [facility] Phone: [phone] to help find your  was also unsuccessful.       You are being admitted to a crisis home today.  [facility]  Crisis Residence  [address] [place] [address] Phone: [phone]         You had a Rule 25 assessment on October 28, 2019 at  [facility] with Rachael Spear.  This was sent to Psychiatric Rule 25 and Alfonso Munroe authorized [facility].  You are being admitted to [facility] on November 7, 2019 at 9:30AM for the complex program that goes for 4-6 months. There is a housing worker that will help you find housing.  [address] for [facility] s Program  [place], [place]  Phone: [phone]  The Health Unit Coordinator has faxed these discharge instructions to Fax: [phone]      Attend your psychiatric medication management appointment with Kenyetta Degroot PA-C on Tuesday, December 10, 2019 at 2PM.  [facility]   [address],   [place] [address]  [phone]  The Health Unit Coordinator has faxed these discharge instructions to Fax: [phone]      Attend all scheduled appointments with your outpatient providers. Call at least 24 hours in advance if you need to reschedule an appointment to ensure continued access to your outpatient providers.   Major Treatments, Procedures and Findings:  You were provided with: a psychiatric assessment, medication evaluation and/or management and milieu management    Amphetamine Qual Urine Positive  Abnormal   NEG^Negative         Symptoms to Report: feeling more aggressive, losing more sleep or mood getting worse    Early warning signs can include: increased unusual thinking, such as paranoia or hearing voices    Safety and Wellness:  Take all medicines as directed.  Make no changes unless your doctor suggests them.      Follow treatment recommendations.  Refrain from alcohol and non-prescribed drugs.  If there is a concern for safety, call [phone].    Resources:   National [place] on Mental Illness (www.[place].jag.org): 148.231.5217 or 887-612-6257.      Crisis services are available 24/7 if you are having a mental health crisis.    COPE (Community Outreach for Psychiatric Emergencies): [phone]    In the [place] area, call **CRISIS (628181) from a cell phone to talk to a team of professionals who can help you.    Crisis Text Line: Text [place] to 923252      The treatment team has appreciated the opportunity to work with you.     If you have any questions or concerns our unit number is [phone]

## 2019-10-31 NOTE — PROGRESS NOTES
TAKE these medications     TAKE these medications        divalproex sodium extended-release 500 MG 24 hr tablet   Also known as: DEPAKOTE ER   INSTRUCTIONS: Take 4 tablets (2,000 mg) by mouth At Bedtime   LAST TAKEN: 2,000 mg on October 30, 2019  9:00 PM     OLANZapine 20 MG tablet   Also known as: zyPREXA   INSTRUCTIONS: Take 1 tablet (20 mg) by mouth At Bedtime   LAST TAKEN: 20 mg on October 30, 2019  9:00 PM

## 2019-10-31 NOTE — PROGRESS NOTES
"[facility] [place]   Psychiatric Progress Note    Length of stay (days): 6        Interim History:   The patient's care was discussed with the treatment team during the daily team meeting and/or staff's chart notes were reviewed.  Staff report: no acute issues over night.  Taking his medications.  Mostly isolating to his room.  He appears to be experiencing overt hallucinations.    Patient reports that his mood is improving, less depressed, less anxious.  \"My medications seem to be working.\"    Patient denies SI/HI.  Auditory hallucinations are present however diminishing.  Sleeping well.  Energy is low.  Concentration is limited yet improving.    He remains motivated to pursue residential chemical addiction treatment.         Medications:       divalproex sodium extended-release  2,000 mg Oral At Bedtime     OLANZapine  20 mg Oral At Bedtime          Allergies:     Allergies   Allergen Reactions     Amoxicillin Rash     Penicillins Rash          Labs:   No results found for this or any previous visit (from the past 24 hour(s)).       Psychiatric Examination:     /76   Pulse 91   Temp 97.8  F (36.6  C) (Oral)   Resp 16   Ht 1.829 m (6')   Wt 97.3 kg (214 lb 9.6 oz)   BMI 29.10 kg/m    Weight is 214 lbs 9.6 oz  Body mass index is 29.1 kg/m .  Orthostatic Vitals     None            Appearance: awake, alert, unkempt and disheveled   Attitude:  cooperative  Eye Contact:  fair  Mood:  better  Affect:  mood congruent  Speech:  decreased prosody  Psychomotor Behavior:  no evidence of tardive dyskinesia, dystonia, or tics  Throught Process:  linear  Associations:  no loose associations  Thought Content:  no evidence of suicidal ideation or homicidal ideation and auditory hallucinations present  Insight:  partial  Judgement:  fair  Oriented to:  time, person, and place  Attention Span and Concentration:  fair  Recent and Remote Memory:  fair    Clinical Global " Impressions  First:  Considering your total clinical experience with this particular patient population, how severe are the patient's symptoms at this time?: 7 (10/26/19 1416)  Compared to the patient's condition at the START of treatment, this patient's condition is:: 4 (10/26/19 1416)  Most recent:  Considering your total clinical experience with this particular patient population, how severe are the patient's symptoms at this time?: 7 (10/26/19 1416)  Compared to the patient's condition at the START of treatment, this patient's condition is:: 4 (10/26/19 1416)         Precautions:     Behavioral Orders   Procedures     Assault precautions     Code 1 - Restrict to Unit     Routine Programming     As clinically indicated     Single Room     Status 15     Every 15 minutes.          DIagnoses:     Schizoaffective disorder-bipolar type  Stimulant use disorder, amphetamine type substance, moderate  Consider a substance-induced mood disorder (related to amphetamine use)  History of PTSD         Plan:     Continue Depakote and Zyprexa for mood stabilization and reduction of psychosis.  Monitoring response and tolerability as regular compliance is regained.    Psychosocial treatments to be addressed with social work consulting groups.  A chemical health assessment was completed on October 28.    Legal status: Voluntary status    Disposition: Ideal disposition would be to transition him directly into a residential MI/CD treatment program.  Exploring available options.

## 2019-10-31 NOTE — DISCHARGE INSTRUCTIONS
Behavioral Discharge Planning and Instructions      Summary:  You were admitted on 10/25/2019  due to psychoses with auditory hallucinations and hostility. You had been off your medications. You  called 911 from the [facility].  You were treated by Dr. Han Munguia MD.  You were restarted on your medications. You had a Rule 25 assessment.  You were scheduled to enter the [facility]  on November 7, 2019. You were discharged on 10/31/2019 from Station 30 to Crisis Residence. You are using MNet for transportation.      Principal Diagnosis:     Schizoaffective disorder-bipolar type  Stimulant use disorder, amphetamine type substance, moderate  Consider a substance-induced mood disorder (related to amphetamine use)  History of PTSD      Health Care Follow-up Appointments:   You have Medicare and signed the required paperwork.  You also have medical assistance.You have medical assistance for insurance coverage.You can use this service for transportation to your health care appointments if you do not have a way to get to your appointments. MNet - - [phone]     You have a Social  Payee.  Santos.  [employer] Payee  [address] [place], [place], [address] [phone]      You have a [facility] . Attempts to reach [facility] [phone] through [employer] [place] were unsuccessful.   Attempts to contact the [facility] Phone: [phone] to help find your  was also unsuccessful.       You are being admitted to a crisis home today.  [facility]  Crisis Residence  [address] [place] [address] Phone: [phone]         You had a Rule 25 assessment on October 28, 2019 at [facility] with Rachael Spear.  This was sent to [place] [address] and Alfonso Munroe authorized [facility].  You are being admitted to [facility] on  November 7, 2019 at 9:30AM for the complex program that goes for 4-6 months. There is a housing worker that will help you find housing.  [address] for Men s Program  [place], [place]  Phone: [phone]  The Health Unit Coordinator has faxed these discharge instructions to Fax: [phone]      Attend your psychiatric medication management appointment with Kenyetta Degroot PA-C on Tuesday, December 10, 2019 at 2PM.  [facility]   [address],   [place] [address]  [phone]  The Health Unit Coordinator has faxed these discharge instructions to Fax: [phone]      Attend all scheduled appointments with your outpatient providers. Call at least 24 hours in advance if you need to reschedule an appointment to ensure continued access to your outpatient providers.   Major Treatments, Procedures and Findings:  You were provided with: a psychiatric assessment, medication evaluation and/or management and milieu management    Amphetamine Qual Urine Positive  Abnormal   NEG^Negative         Symptoms to Report: feeling more aggressive, losing more sleep or mood getting worse    Early warning signs can include: increased unusual thinking, such as paranoia or hearing voices    Safety and Wellness:  Take all medicines as directed.  Make no changes unless your doctor suggests them.      Follow treatment recommendations.  Refrain from alcohol and non-prescribed drugs.  If there is a concern for safety, call [phone].    Resources:   National [place] on Mental Illness (www.[place].jag.org): 259.616.1800 or 849-030-7299.      Crisis services are available 24/7 if you are having a mental health crisis.    COPE (Community Outreach for Psychiatric Emergencies): [phone]    In the [facility], call **CRISIS ([phone]) from a cell phone to talk to a team of professionals who can help you.    Crisis Text Line: Text [place] to 389444      The treatment team has appreciated the opportunity to work  with you.     If you have any questions or concerns our unit number is [phone]

## 2019-11-07 ENCOUNTER — TRANSFERRED RECORDS (OUTPATIENT)
Dept: HEALTH INFORMATION MANAGEMENT | Facility: CLINIC | Age: 44
End: 2019-11-07

## 2019-11-20 ENCOUNTER — HOSPITAL ENCOUNTER (EMERGENCY)
Facility: CLINIC | Age: 44
Discharge: HOME | End: 2019-11-20
Attending: FAMILY MEDICINE | Admitting: FAMILY MEDICINE
Payer: MEDICARE

## 2019-11-20 VITALS
TEMPERATURE: 96.6 F | HEART RATE: 80 BPM | BODY MASS INDEX: 30.92 KG/M2 | WEIGHT: 228 LBS | OXYGEN SATURATION: 96 % | DIASTOLIC BLOOD PRESSURE: 51 MMHG | RESPIRATION RATE: 16 BRPM | SYSTOLIC BLOOD PRESSURE: 135 MMHG

## 2019-11-20 DIAGNOSIS — K62.5 HEMORRHAGE OF RECTUM AND ANUS: ICD-10-CM

## 2019-11-20 LAB
ALBUMIN SERPL-MCNC: 3.7 G/DL (ref 3.4–5)
ALP SERPL-CCNC: 64 U/L (ref 40–150)
ALT SERPL W P-5'-P-CCNC: 35 U/L (ref 0–70)
ANION GAP SERPL CALCULATED.3IONS-SCNC: 4 MMOL/L (ref 3–14)
APTT PPP: 33 SEC (ref 22–37)
AST SERPL W P-5'-P-CCNC: 18 U/L (ref 0–45)
BASOPHILS # BLD AUTO: 0 10E9/L (ref 0–0.2)
BASOPHILS NFR BLD AUTO: 0.2 %
BILIRUB SERPL-MCNC: 0.4 MG/DL (ref 0.2–1.3)
BUN SERPL-MCNC: 10 MG/DL (ref 7–30)
CALCIUM SERPL-MCNC: 8.5 MG/DL (ref 8.5–10.1)
CHLORIDE SERPL-SCNC: 106 MMOL/L (ref 94–109)
CO2 SERPL-SCNC: 28 MMOL/L (ref 20–32)
CREAT SERPL-MCNC: 0.86 MG/DL (ref 0.66–1.25)
DIFFERENTIAL METHOD BLD: NORMAL
EOSINOPHIL # BLD AUTO: 0.2 10E9/L (ref 0–0.7)
EOSINOPHIL NFR BLD AUTO: 1.8 %
ERYTHROCYTE [DISTWIDTH] IN BLOOD BY AUTOMATED COUNT: 13.5 % (ref 10–15)
GFR SERPL CREATININE-BSD FRML MDRD: >90 ML/MIN/{1.73_M2}
GLUCOSE SERPL-MCNC: 80 MG/DL (ref 70–99)
HCT VFR BLD AUTO: 46.3 % (ref 40–53)
HGB BLD-MCNC: 15.6 G/DL (ref 13.3–17.7)
IMM GRANULOCYTES # BLD: 0 10E9/L (ref 0–0.4)
IMM GRANULOCYTES NFR BLD: 0.2 %
INR PPP: 0.92 (ref 0.86–1.14)
LYMPHOCYTES # BLD AUTO: 2.2 10E9/L (ref 0.8–5.3)
LYMPHOCYTES NFR BLD AUTO: 26.4 %
MCH RBC QN AUTO: 33 PG (ref 26.5–33)
MCHC RBC AUTO-ENTMCNC: 33.7 G/DL (ref 31.5–36.5)
MCV RBC AUTO: 98 FL (ref 78–100)
MONOCYTES # BLD AUTO: 1.2 10E9/L (ref 0–1.3)
MONOCYTES NFR BLD AUTO: 13.9 %
NEUTROPHILS # BLD AUTO: 4.8 10E9/L (ref 1.6–8.3)
NEUTROPHILS NFR BLD AUTO: 57.5 %
NRBC # BLD AUTO: 0 10*3/UL
NRBC BLD AUTO-RTO: 0 /100
PLATELET # BLD AUTO: 187 10E9/L (ref 150–450)
POTASSIUM SERPL-SCNC: 4.1 MMOL/L (ref 3.4–5.3)
PROT SERPL-MCNC: 7 G/DL (ref 6.8–8.8)
RBC # BLD AUTO: 4.73 10E12/L (ref 4.4–5.9)
SODIUM SERPL-SCNC: 138 MMOL/L (ref 133–144)
WBC # BLD AUTO: 8.3 10E9/L (ref 4–11)

## 2019-11-20 PROCEDURE — 85610 PROTHROMBIN TIME: CPT | Performed by: FAMILY MEDICINE

## 2019-11-20 PROCEDURE — 99283 EMERGENCY DEPT VISIT LOW MDM: CPT | Performed by: FAMILY MEDICINE

## 2019-11-20 PROCEDURE — 99283 EMERGENCY DEPT VISIT LOW MDM: CPT | Mod: Z6 | Performed by: FAMILY MEDICINE

## 2019-11-20 PROCEDURE — 85730 THROMBOPLASTIN TIME PARTIAL: CPT | Performed by: FAMILY MEDICINE

## 2019-11-20 PROCEDURE — 85025 COMPLETE CBC W/AUTO DIFF WBC: CPT | Performed by: FAMILY MEDICINE

## 2019-11-20 PROCEDURE — 80053 COMPREHEN METABOLIC PANEL: CPT | Performed by: FAMILY MEDICINE

## 2019-11-20 RX ORDER — POLYETHYLENE GLYCOL 3350 17 G/17G
1 POWDER, FOR SOLUTION ORAL DAILY
Qty: 527 G | Refills: 0 | Status: SHIPPED | OUTPATIENT
Start: 2019-11-20 | End: 2019-12-20

## 2019-11-20 RX ORDER — ZINC/PETROLATUM,WHITE
1 PASTE (GRAM) TOPICAL
Qty: 60 G | Refills: 0 | Status: SHIPPED | OUTPATIENT
Start: 2019-11-20

## 2019-11-20 NOTE — ED PROVIDER NOTES
"    [facility] EMERGENCY DEPARTMENT ([facility])    11/20/19      History     Chief Complaint   Patient presents with     Groin Pain     has a painful rash in the groin area for the past month     Rectal Bleeding     rectal bleeding with BM for the past month     HPI  Bertram Woods is a 44 year old male with a past medical history of schizoaffective disorder, PTSD, polysubstance abuse, homicidal ideations, homelessness, depressive disorder, and bipolar 1 disorder who presents to the Emergency Department for evaluation of groin pain and rectal bleeding.  Patient states his rash has been ongoing in his groin area for the past month.  Patient states that this rash \"hurts and burns all day\".  Patient also noticed blood upon wiping for the past month as well.  Patient reports that he sometimes does has to push increasingly hard for BMs.  The patient has noticed bright red blood upon wiping.  Patient also noticed bright red blood in his underwear in late October.  Patient denies abdominal pain, nausea, vomiting, or any other symptoms at this time.    Past Medical History:   Diagnosis Date     Bipolar 1 disorder (H)      Cannabis dependence (H)      Depressive disorder      Homelessness      Homicidal ideations      Polysubstance abuse (H)      PTSD (post-traumatic stress disorder)      Schizoaffective disorder (H)      Suicidal ideations        Past Surgical History:   Procedure Laterality Date     ARTHROPLASTY CARPOMETACARPAL (THUMB JOINT)         Family History   Problem Relation Age of Onset     Colon Cancer Father        Social History     Tobacco Use     Smoking status: Current Every Day Smoker     Packs/day: 0.25     Types: Cigarettes     Smokeless tobacco: Never Used   Substance Use Topics     Alcohol use: Not Currently       No current facility-administered medications for this encounter.      Current Outpatient Medications   Medication     divalproex sodium extended-release (DEPAKOTE ER) 500 MG 24 hr " tablet     OLANZapine (ZYPREXA) 20 MG tablet     polyethylene glycol (MIRALAX) powder     zinc-white petrolatum (ILEX) 58.3 % external paste        Allergies   Allergen Reactions     Amoxicillin Rash     Penicillins Rash       I have reviewed the Medications, Allergies, Past Medical and Surgical History, and Social History in the Epic system.    Review of Systems  ROS: 14 point ROS neg other than the symptoms noted above in the HPI.  Physical Exam   BP: 135/51  Pulse: 80  Temp: 96.6  F (35.9  C)  Resp: 16  Weight: 103.4 kg (228 lb)  SpO2: 96 %      Physical Exam  Constitutional:       General: He is not in acute distress.     Appearance: He is not diaphoretic.   HENT:      Head: Atraumatic.   Eyes:      General: No scleral icterus.     Pupils: Pupils are equal, round, and reactive to light.   Cardiovascular:      Heart sounds: Normal heart sounds.   Pulmonary:      Effort: No respiratory distress.      Breath sounds: Normal breath sounds.   Abdominal:      General: Bowel sounds are normal.      Palpations: Abdomen is soft.      Tenderness: There is no abdominal tenderness.   Genitourinary:     Prostate: Normal.      Rectum: Guaiac result negative. Tenderness and anal fissure present. No mass or external hemorrhoid. Normal anal tone.          Comments: Area with the skin appears somewhat macerated and excessive moisture, which appears to be related to anal leakge and poor hygiene.  Musculoskeletal:         General: No tenderness.   Skin:     General: Skin is warm.      Findings: No rash.   Neurological:      General: No focal deficit present.      Mental Status: Mental status is at baseline.   Psychiatric:         Mood and Affect: Mood normal.         Behavior: Behavior normal.         Thought Content: Thought content normal.         ED Course   12:46 PM  The patient was seen and examined by Ry Branch MD in Room ED03.        Procedures             Critical Care time:  none             Labs Ordered and Resulted  from Time of ED Arrival Up to the Time of Departure from the ED   CBC WITH PLATELETS DIFFERENTIAL   COMPREHENSIVE METABOLIC PANEL   INR   PARTIAL THROMBOPLASTIN TIME            Assessments & Plan (with Medical Decision Making)   44-year-old male with extensive psychiatric history is presenting with rectal bleeding, and what appears to be local irritation of the skin around his anus and buttocks.  Initial diagnosis includes hemorrhoids, anal fissure, perirectal abscess, proctitis, colitis, diverticulitis, AV malformation, polyps.  Exam he has stable vitals, some rectal tenderness, small fissure, Hemoccult negative stool, and the aforementioned rash.  Soft and nontender abdomen.  Normal genitourinary exam without any signs of Sarah's gangrene or perirectal abscess.  The remainder of a complete physical exam is normal.  Patient's labs are normal.  Patient appears to have stable bleeding from the rectal and anal area, no red flags for life-threatening etiology, no signs of hemodynamic instability, appears appropriate for outpatient evaluation.  His father does have a history of colon cancer in his 50s and the patient has never had a colonoscopy.  Will refer back to his primary doctor to arrange colonoscopy and start symptomatic treatment.  We discussed the indications for emergency department return and follow-up.  Stable for discharge.        I have reviewed the nursing notes.    I have reviewed the findings, diagnosis, plan and need for follow up with the patient.    New Prescriptions    POLYETHYLENE GLYCOL (MIRALAX) POWDER    Take 17 g (1 capful) by mouth daily    ZINC-WHITE PETROLATUM (ILEX) 58.3 % EXTERNAL PASTE    Apply 1 g topically every hour as needed for irritation or skin protection       Final diagnoses:   Hemorrhage of rectum and anus   I, Tim Powell, am serving as a trained medical scribe to document services personally performed by Ry Branch MD, based on the provider's statements to me.       I, Ry Branch MD, was physically present and have reviewed and verified the accuracy of this note documented by Tim Powell.     11/20/2019   [facility], [place], EMERGENCY DEPARTMENT     Ry Branch MD  11/20/19 6226

## 2019-11-20 NOTE — ED TRIAGE NOTES
"Pt arrives reporting BRB per rectum x 3 weeks, states \"it started at the [facility]\", \"and when I went to the bathroom my underwear was full of blood\". Currently residing [facility] treatment for meth use. Last use 1 month PTA.   "

## 2019-11-20 NOTE — DISCHARGE INSTRUCTIONS
Thank you for choosing [facility].     Please closely monitor for further symptoms. Return to the Emergency Department if you develop any new or worsening signs or symptoms.    If you received any opiate pain medications or sedatives during your visit, please do not drive for at least 8 hours.     Labs, cultures or final xray interpretations may still need to be reviewed.  We will call you if your plan of care needs to be changed.    Please make an appointment to follow up with Your Primary Care Provider and GI Clinic (phone: [phone]) as soon as possible.

## 2019-11-20 NOTE — ED AVS SNAPSHOT
[facility] [place], Emergency Department  [address]  [place] [address]  Phone:  [phone]  Fax:  [phone]                                    Bertram Woods   MRN: [id number]    Department:  [facility], Emergency Department   Date of Visit:  11/20/2019           After Visit Summary Signature Page    I have received my discharge instructions, and my questions have been answered. I have discussed any challenges I see with this plan with the nurse or doctor.    ..........................................................................................................................................  Patient/Patient Representative Signature      ..........................................................................................................................................  Patient Representative Print Name and Relationship to Patient    ..................................................               ................................................  Date                                   Time    ..........................................................................................................................................  Reviewed by Signature/Title    ...................................................              ..............................................  Date                                               Time          22EPIC Rev 08/18

## 2021-05-31 ENCOUNTER — RECORDS - HEALTHEAST (OUTPATIENT)
Dept: ADMINISTRATIVE | Facility: CLINIC | Age: 46
End: 2021-05-31

## 2021-06-21 NOTE — PROGRESS NOTES
10/25/19 2964   Patient Belongings   Did you bring any home meds/supplements to the hospital?  Yes   Disposition of meds  Other (see comment)  (Given to nurse)   Patient Belongings other (see comments)   Belongings Search Yes   Clothing Search Yes   Second Staff Ayo Salinas: Blue jeans, lighter, $3 cash, less than $1 loose change, black shoes, 2 pairs of socks, glasses, yellow sweatshirt, black hoodie, blue hat, white/red/black shirt, sling, and bandage.     Security envelope # [id number]: [employer] Visa ([id number]) and [place] EBT ([id number]).  Security Envelope [id number]- medications     A               Admission:  I am responsible for any personal items that are not sent to the safe or pharmacy.  [facility] is not responsible for loss, theft or damage of any property in my possession.    Signature:  _________________________________ Date: _______  Time: _____                                              Staff Signature:  ____________________________ Date: ________  Time: _____      2nd Staff person, if patient is unable/unwilling to sign:    Signature: ________________________________ Date: ________  Time: _____     Discharge:  [facility] has returned all of my personal belongings:    Signature: _________________________________ Date: ________  Time: _____                                          Staff Signature:  ____________________________ Date: ________  Time: _____          Unscheduled

## 2021-11-09 VITALS
OXYGEN SATURATION: 98 % | SYSTOLIC BLOOD PRESSURE: 122 MMHG | BODY MASS INDEX: 21.67 KG/M2 | HEIGHT: 72 IN | TEMPERATURE: 98.2 F | RESPIRATION RATE: 20 BRPM | WEIGHT: 160 LBS | DIASTOLIC BLOOD PRESSURE: 56 MMHG | HEART RATE: 81 BPM

## 2021-11-09 PROCEDURE — 99281 EMR DPT VST MAYX REQ PHY/QHP: CPT

## 2021-11-09 ASSESSMENT — MIFFLIN-ST. JEOR: SCORE: 1633.76

## 2021-11-10 ENCOUNTER — HOSPITAL ENCOUNTER (EMERGENCY)
Facility: CLINIC | Age: 46
Discharge: HOME | End: 2021-11-10
Attending: EMERGENCY MEDICINE | Admitting: EMERGENCY MEDICINE
Payer: MEDICARE

## 2021-11-10 DIAGNOSIS — Z59.00 HOMELESSNESS: ICD-10-CM

## 2021-11-10 SDOH — ECONOMIC STABILITY - HOUSING INSECURITY: HOMELESSNESS UNSPECIFIED: Z59.00

## 2021-11-10 ASSESSMENT — ENCOUNTER SYMPTOMS: MYALGIAS: 0

## 2021-11-10 NOTE — ED NOTES
Saw pt with MD, pt adamant that he has no medical concerns, pt denies SI/HI. Pt is AO. Pt ambulated freely, no injuries noted.    He states he is only here because he needs food. Pt was given snacks and DCed     Pt refused vitals and AVS.

## 2021-11-10 NOTE — ED TRIAGE NOTES
[facility]  ED Arrival Note    Arrived to ED/ triage via [facility]. Reportedly pt was trespassed by PD after he was reported to be exposing himself to patrons at a store. Pt asked to be brought to the hospital because he has been experiencing leg/foot pain for many days. Pt states that he is homeless and has been walking for a long time. Pt falls asleep easily in triage and is unable to hold a coherent conversation. Pt denies any ETOH or drug use. Pt denies any SI/HI at this time.     Visitors during triage: None    Triage Interventions: N/A    Ambulatory: Yes    Meets Stroke Criteria?: No    Meets Trauma Criteria?: No    Directed to: Triage Lobby

## 2021-11-13 ENCOUNTER — HOSPITAL ENCOUNTER (EMERGENCY)
Facility: CLINIC | Age: 46
Discharge: LEFT BEFORE DISCHARGE | End: 2021-11-13
Payer: MEDICARE

## 2021-11-13 VITALS
TEMPERATURE: 97.2 F | RESPIRATION RATE: 19 BRPM | WEIGHT: 160 LBS | OXYGEN SATURATION: 97 % | SYSTOLIC BLOOD PRESSURE: 136 MMHG | DIASTOLIC BLOOD PRESSURE: 84 MMHG | BODY MASS INDEX: 21.7 KG/M2 | HEART RATE: 85 BPM

## 2021-11-13 NOTE — ED TRIAGE NOTES
"Pt ambulatory to triage for evaluation of toe pain. Pt will not let this RN see said toe. Pt has been seen 8 times since 11/10 \"for the same issue\" per pt. Pt unwilling to answer many questions in triage.    Pt is homeless.  "